# Patient Record
Sex: MALE | Race: WHITE | HISPANIC OR LATINO | Employment: OTHER | URBAN - METROPOLITAN AREA
[De-identification: names, ages, dates, MRNs, and addresses within clinical notes are randomized per-mention and may not be internally consistent; named-entity substitution may affect disease eponyms.]

---

## 2018-04-10 ENCOUNTER — OFFICE VISIT (OUTPATIENT)
Dept: PRIMARY CARE CLINIC | Facility: CLINIC | Age: 58
End: 2018-04-10
Payer: MEDICARE

## 2018-04-10 ENCOUNTER — CLINICAL SUPPORT (OUTPATIENT)
Dept: PRIMARY CARE CLINIC | Facility: CLINIC | Age: 58
End: 2018-04-10
Payer: MEDICARE

## 2018-04-10 VITALS
SYSTOLIC BLOOD PRESSURE: 129 MMHG | RESPIRATION RATE: 18 BRPM | WEIGHT: 183 LBS | DIASTOLIC BLOOD PRESSURE: 65 MMHG | BODY MASS INDEX: 25.62 KG/M2 | TEMPERATURE: 98 F | HEIGHT: 71 IN | OXYGEN SATURATION: 94 % | HEART RATE: 66 BPM

## 2018-04-10 DIAGNOSIS — R04.0 EPISTAXIS: ICD-10-CM

## 2018-04-10 DIAGNOSIS — M17.12 OSTEOARTHRITIS OF LEFT KNEE, UNSPECIFIED OSTEOARTHRITIS TYPE: Primary | ICD-10-CM

## 2018-04-10 DIAGNOSIS — M25.511 CHRONIC RIGHT SHOULDER PAIN: ICD-10-CM

## 2018-04-10 DIAGNOSIS — N40.0 BPH WITHOUT URINARY OBSTRUCTION: ICD-10-CM

## 2018-04-10 DIAGNOSIS — L30.9 ECZEMA, UNSPECIFIED TYPE: ICD-10-CM

## 2018-04-10 DIAGNOSIS — K21.9 GASTROESOPHAGEAL REFLUX DISEASE, ESOPHAGITIS PRESENCE NOT SPECIFIED: ICD-10-CM

## 2018-04-10 DIAGNOSIS — M51.36 DDD (DEGENERATIVE DISC DISEASE), LUMBAR: ICD-10-CM

## 2018-04-10 DIAGNOSIS — G89.29 CHRONIC RIGHT SHOULDER PAIN: ICD-10-CM

## 2018-04-10 DIAGNOSIS — Z98.890 HISTORY OF BACK SURGERY: ICD-10-CM

## 2018-04-10 PROBLEM — M51.369 DDD (DEGENERATIVE DISC DISEASE), LUMBAR: Status: ACTIVE | Noted: 2018-04-10

## 2018-04-10 LAB
ALBUMIN SERPL BCP-MCNC: 4.2 G/DL
ALP SERPL-CCNC: 67 U/L
ALT SERPL W/O P-5'-P-CCNC: 24 U/L
ANION GAP SERPL CALC-SCNC: 9 MMOL/L
AST SERPL-CCNC: 20 U/L
BASOPHILS # BLD AUTO: 0.08 K/UL
BASOPHILS NFR BLD: 0.9 %
BILIRUB SERPL-MCNC: 0.4 MG/DL
BUN SERPL-MCNC: 16 MG/DL
CALCIUM SERPL-MCNC: 10 MG/DL
CHLORIDE SERPL-SCNC: 101 MMOL/L
CHOLEST SERPL-MCNC: 154 MG/DL
CHOLEST/HDLC SERPL: 3.5 {RATIO}
CO2 SERPL-SCNC: 31 MMOL/L
COMPLEXED PSA SERPL-MCNC: 0.53 NG/ML
CREAT SERPL-MCNC: 0.8 MG/DL
DIFFERENTIAL METHOD: ABNORMAL
EOSINOPHIL # BLD AUTO: 0.3 K/UL
EOSINOPHIL NFR BLD: 3.6 %
ERYTHROCYTE [DISTWIDTH] IN BLOOD BY AUTOMATED COUNT: 11.5 %
EST. GFR  (AFRICAN AMERICAN): >60 ML/MIN/1.73 M^2
EST. GFR  (NON AFRICAN AMERICAN): >60 ML/MIN/1.73 M^2
GLUCOSE SERPL-MCNC: 77 MG/DL
HCT VFR BLD AUTO: 43.4 %
HDLC SERPL-MCNC: 44 MG/DL
HDLC SERPL: 28.6 %
HGB BLD-MCNC: 13.8 G/DL
IMM GRANULOCYTES # BLD AUTO: 0.02 K/UL
IMM GRANULOCYTES NFR BLD AUTO: 0.2 %
LDLC SERPL CALC-MCNC: 78.2 MG/DL
LYMPHOCYTES # BLD AUTO: 2.5 K/UL
LYMPHOCYTES NFR BLD: 28.1 %
MCH RBC QN AUTO: 30.9 PG
MCHC RBC AUTO-ENTMCNC: 31.8 G/DL
MCV RBC AUTO: 97 FL
MONOCYTES # BLD AUTO: 0.6 K/UL
MONOCYTES NFR BLD: 6.8 %
NEUTROPHILS # BLD AUTO: 5.3 K/UL
NEUTROPHILS NFR BLD: 60.4 %
NONHDLC SERPL-MCNC: 110 MG/DL
NRBC BLD-RTO: 0 /100 WBC
PLATELET # BLD AUTO: 265 K/UL
PMV BLD AUTO: 10.7 FL
POTASSIUM SERPL-SCNC: 4.7 MMOL/L
PROT SERPL-MCNC: 7.2 G/DL
RBC # BLD AUTO: 4.47 M/UL
SODIUM SERPL-SCNC: 141 MMOL/L
TRIGL SERPL-MCNC: 159 MG/DL
TSH SERPL DL<=0.005 MIU/L-ACNC: 0.49 UIU/ML
WBC # BLD AUTO: 8.84 K/UL

## 2018-04-10 PROCEDURE — 99213 OFFICE O/P EST LOW 20 MIN: CPT | Mod: S$GLB,,, | Performed by: FAMILY MEDICINE

## 2018-04-10 PROCEDURE — 84443 ASSAY THYROID STIM HORMONE: CPT

## 2018-04-10 PROCEDURE — 99499 UNLISTED E&M SERVICE: CPT | Mod: S$GLB,,, | Performed by: FAMILY MEDICINE

## 2018-04-10 PROCEDURE — 84153 ASSAY OF PSA TOTAL: CPT

## 2018-04-10 PROCEDURE — 80061 LIPID PANEL: CPT

## 2018-04-10 PROCEDURE — 80053 COMPREHEN METABOLIC PANEL: CPT

## 2018-04-10 PROCEDURE — 99999 PR PBB SHADOW E&M-EST. PATIENT-LVL II: CPT | Mod: PBBFAC,,,

## 2018-04-10 PROCEDURE — 99999 PR PBB SHADOW E&M-NEW PATIENT-LVL IV: CPT | Mod: PBBFAC,,, | Performed by: FAMILY MEDICINE

## 2018-04-10 PROCEDURE — 85025 COMPLETE CBC W/AUTO DIFF WBC: CPT

## 2018-04-10 RX ORDER — FLUTICASONE PROPIONATE 50 MCG
2 SPRAY, SUSPENSION (ML) NASAL DAILY
Qty: 1 BOTTLE | Refills: 5 | Status: SHIPPED | OUTPATIENT
Start: 2018-04-10 | End: 2019-11-01 | Stop reason: CLARIF

## 2018-04-10 NOTE — PROGRESS NOTES
Pt ID verified by Name and . EKG done per MD order. Pt tolerated well. Result signed off by Dr. Garcia.

## 2018-04-10 NOTE — PROGRESS NOTES
Subjective:       Patient ID: Michelle Hankins is a 57 y.o. male.    Chief Complaint: Knee Pain (left knee )    HPI: 57-year-old male patient was seen by orthopedist Dr. Saul Smith sent with a prescription for an x-ray of the left knee secondary to arthritis.  Patient was seen by Dr. Smith had a knee injection due to a swollen knee.  Patient had problems with the left knee for years.  Unable to bend the knee, able to walk but painful.  Got shot in the knee on Friday 5 days ago.  Still with some pain.  Patient on Roxicodone and Lodine..       Patient has a history of a dislocated right shoulder and states orthopedist wants to do surgery but he is not ready for that yet.  Has crepitus of the right shoulder when he rotates it or tries to raise arm above head.  Had surgery on the left shoulder.           ROS:  Skin: no psoriasis, eczema, skin cancer Pt seen Dr Patel for dry skin lateral chest wall   HEENT: No headache, ocular pain, blurred vision, diplopia, +epistaxis, no hoarseness change in voice, thyroid trouble patient with history of ALLERGIES and nosebleeds left nostril hx nasal polyp   Lung: No pneumonia, asthma, Tb, wheezing, SOB, no smoking  Heart: No chest pain, ankle edema, palpitations, MI, mary murmur, hypertension, hyperlipidemia  Abdomen: No nausea, vomiting, diarrhea, constipation, ulcers, hepatitis, gallbladder disease, melena, hematochezia, hematemesis Hx GERD doing well  : no UTI, renal disease, stones Hx BPH   MS: no fractures, lupus, rheumatoid, gout See HPI   Neuro: No dizziness, LOC, seizures   No diabetes, no anemia, no anxiety, no depression   , one child, SSI due back problems -- had history of back surgery in the past told needs another surgery.  Lives with wife    Objective:   Physical Exam:  General: Well nourished, well developed, no acute distress  Skin: Dry skin especially on the trunk lateral chest area  HEENT: Eyes PERRLA, EOM intact, nose patent, throat  non-erythematous ears TM clear  NECK: Supple, no bruits, No JVD, no nodes  Lungs: Clear, no rales, rhonchi, wheezing  Heart: Regular rate and rhythm, no murmurs, gallops, or rubs  Abdomen: flat, bowel sounds positive, no tenderness, or organomegaly  MS: Some crepitus with rotation of the shoulder able raise arms overhead but painful, crepitus of the left knee with anterior posterior flexion able squat MCFP down hard to arise, scar in the lower back from prior surgery anterior flexion 20° extension 10° lateral flexion and rotation 10°  Neuro: Alert, CN intact, oriented X 3  Extremities: No cyanosis, clubbing, or edema         Assessment:       1. Osteoarthritis of left knee, unspecified osteoarthritis type    2. Chronic right shoulder pain    3. DDD (degenerative disc disease), lumbar    4. History of back surgery    5. Epistaxis    6. Eczema, unspecified type    7. Gastroesophageal reflux disease, esophagitis presence not specified    8. BPH without urinary obstruction        Plan:       Osteoarthritis of left knee, unspecified osteoarthritis type  -     X-Ray Knee 3 View Left; Future; Expected date: 04/10/2018    Chronic right shoulder pain    DDD (degenerative disc disease), lumbar    History of back surgery    Epistaxis    Eczema, unspecified type  -     Ambulatory referral to Dermatology    Gastroesophageal reflux disease, esophagitis presence not specified    BPH without urinary obstruction  -     CBC auto differential; Future; Expected date: 04/10/2018  -     Comprehensive metabolic panel; Future; Expected date: 04/10/2018  -     Lipid panel; Future; Expected date: 04/10/2018  -     Prostate Specific Antigen, Diagnostic; Future; Expected date: 04/10/2018  -     X-Ray Chest PA And Lateral; Future; Expected date: 04/10/2018  -     Urinalysis  -     EKG 12-lead; Future  -     TSH; Future; Expected date: 04/10/2018    Other orders  -     fluticasone (FLONASE) 50 mcg/actuation nasal spray; 2 sprays (100 mcg  total) by Each Nare route once daily.  Dispense: 1 Bottle; Refill: 5      patient seen Dr. Saul Smith for left knee arthritis--had injection in her knee on Friday 5 days ago--on Roxicodone for back and knee Lodine--states getting no relief told to address this problem with this orthopedist or we could refer her to a pain clinic  X-ray of the left knee per Dr. Smith's request  Dr. Shoemaker  to evaluate rash left chest wall--eczema  Patient with nosebleeds it persists should seer HARLEY--has history of nasal polyps will try Claritin and Flonase  Moist heat Theragesic range of motion of the back--patient told needs back surgery but does not want to do this at this time.  Patient also told needs right shoulder surgery does not want to do at this time  Needs routine physical CBC CMP lipid UA PSA chest x-ray EKG with x-ray lumbar spine

## 2018-10-08 ENCOUNTER — CLINICAL SUPPORT (OUTPATIENT)
Dept: PRIMARY CARE CLINIC | Facility: CLINIC | Age: 58
End: 2018-10-08
Payer: MEDICARE

## 2018-10-08 DIAGNOSIS — Z23 NEED FOR PROPHYLACTIC VACCINATION AND INOCULATION AGAINST INFLUENZA: Primary | ICD-10-CM

## 2018-10-08 PROCEDURE — 90686 IIV4 VACC NO PRSV 0.5 ML IM: CPT | Mod: PBBFAC,PN

## 2019-03-20 DIAGNOSIS — Z12.11 COLON CANCER SCREENING: ICD-10-CM

## 2020-09-24 ENCOUNTER — OFFICE VISIT (OUTPATIENT)
Dept: PRIMARY CARE CLINIC | Facility: CLINIC | Age: 60
End: 2020-09-24
Payer: MEDICARE

## 2020-09-24 VITALS
HEART RATE: 56 BPM | WEIGHT: 180.88 LBS | DIASTOLIC BLOOD PRESSURE: 84 MMHG | BODY MASS INDEX: 25.32 KG/M2 | RESPIRATION RATE: 17 BRPM | SYSTOLIC BLOOD PRESSURE: 152 MMHG | OXYGEN SATURATION: 98 % | HEIGHT: 71 IN

## 2020-09-24 DIAGNOSIS — N40.0 BPH WITHOUT URINARY OBSTRUCTION: ICD-10-CM

## 2020-09-24 DIAGNOSIS — I10 HYPERTENSION, UNSPECIFIED TYPE: ICD-10-CM

## 2020-09-24 DIAGNOSIS — J33.9 NASAL POLYP: ICD-10-CM

## 2020-09-24 DIAGNOSIS — Z11.4 ENCOUNTER FOR SCREENING FOR HIV: ICD-10-CM

## 2020-09-24 DIAGNOSIS — E78.5 HYPERLIPIDEMIA, UNSPECIFIED HYPERLIPIDEMIA TYPE: ICD-10-CM

## 2020-09-24 DIAGNOSIS — M17.12 OSTEOARTHRITIS OF LEFT KNEE, UNSPECIFIED OSTEOARTHRITIS TYPE: ICD-10-CM

## 2020-09-24 DIAGNOSIS — R41.3 MEMORY LOSS: ICD-10-CM

## 2020-09-24 DIAGNOSIS — Z98.890 HISTORY OF SHOULDER SURGERY: ICD-10-CM

## 2020-09-24 DIAGNOSIS — K21.9 GASTROESOPHAGEAL REFLUX DISEASE, ESOPHAGITIS PRESENCE NOT SPECIFIED: ICD-10-CM

## 2020-09-24 DIAGNOSIS — Z11.59 NEED FOR HEPATITIS C SCREENING TEST: ICD-10-CM

## 2020-09-24 DIAGNOSIS — J32.2 CHRONIC ETHMOIDAL SINUSITIS: ICD-10-CM

## 2020-09-24 DIAGNOSIS — R04.0 EPISTAXIS: Primary | ICD-10-CM

## 2020-09-24 DIAGNOSIS — Z98.890 HISTORY OF BACK SURGERY: ICD-10-CM

## 2020-09-24 DIAGNOSIS — M25.511 CHRONIC RIGHT SHOULDER PAIN: ICD-10-CM

## 2020-09-24 DIAGNOSIS — Z98.890 HISTORY OF PROSTATE SURGERY: ICD-10-CM

## 2020-09-24 DIAGNOSIS — L30.9 ECZEMA, UNSPECIFIED TYPE: ICD-10-CM

## 2020-09-24 DIAGNOSIS — G89.29 CHRONIC RIGHT SHOULDER PAIN: ICD-10-CM

## 2020-09-24 PROCEDURE — 99999 PR PBB SHADOW E&M-EST. PATIENT-LVL III: ICD-10-PCS | Mod: PBBFAC,,, | Performed by: FAMILY MEDICINE

## 2020-09-24 PROCEDURE — 99214 PR OFFICE/OUTPT VISIT, EST, LEVL IV, 30-39 MIN: ICD-10-PCS | Mod: 25,S$GLB,, | Performed by: FAMILY MEDICINE

## 2020-09-24 PROCEDURE — 99499 RISK ADDL DX/OHS AUDIT: ICD-10-PCS | Mod: S$GLB,,, | Performed by: FAMILY MEDICINE

## 2020-09-24 PROCEDURE — 99499 UNLISTED E&M SERVICE: CPT | Mod: S$GLB,,, | Performed by: FAMILY MEDICINE

## 2020-09-24 PROCEDURE — 90686 FLU VACCINE (QUAD) GREATER THAN OR EQUAL TO 3YO PRESERVATIVE FREE IM: ICD-10-PCS | Mod: S$GLB,,, | Performed by: FAMILY MEDICINE

## 2020-09-24 PROCEDURE — G0008 ADMIN INFLUENZA VIRUS VAC: HCPCS | Mod: S$GLB,,, | Performed by: FAMILY MEDICINE

## 2020-09-24 PROCEDURE — 3008F BODY MASS INDEX DOCD: CPT | Mod: CPTII,S$GLB,, | Performed by: FAMILY MEDICINE

## 2020-09-24 PROCEDURE — 99214 OFFICE O/P EST MOD 30 MIN: CPT | Mod: 25,S$GLB,, | Performed by: FAMILY MEDICINE

## 2020-09-24 PROCEDURE — G0008 FLU VACCINE (QUAD) GREATER THAN OR EQUAL TO 3YO PRESERVATIVE FREE IM: ICD-10-PCS | Mod: S$GLB,,, | Performed by: FAMILY MEDICINE

## 2020-09-24 PROCEDURE — 3008F PR BODY MASS INDEX (BMI) DOCUMENTED: ICD-10-PCS | Mod: CPTII,S$GLB,, | Performed by: FAMILY MEDICINE

## 2020-09-24 PROCEDURE — 90686 IIV4 VACC NO PRSV 0.5 ML IM: CPT | Mod: S$GLB,,, | Performed by: FAMILY MEDICINE

## 2020-09-24 PROCEDURE — 99999 PR PBB SHADOW E&M-EST. PATIENT-LVL III: CPT | Mod: PBBFAC,,, | Performed by: FAMILY MEDICINE

## 2020-09-24 RX ORDER — LOSARTAN POTASSIUM 50 MG/1
50 TABLET ORAL DAILY
Qty: 90 TABLET | Refills: 3 | Status: SHIPPED | OUTPATIENT
Start: 2020-09-24 | End: 2021-04-12

## 2020-09-24 NOTE — PROGRESS NOTES
Verified pt ID using name and . NKDA. Administered Influenza vaccine in left deltoid per physician order using aseptic technique. Aspirated and no blood return noted. Pt tolerated well with no adverse reactions noted.

## 2020-09-24 NOTE — PROGRESS NOTES
Subjective:       Patient ID: Michelle Hankins is a 60 y.o. male.    Chief Complaint: Annual Exam    HPI:  60-year-old male in for checkup has not been seen in awhile wants a flu shot      Eating well--+BM--ambulation--saw orthopedist told patient needed surgery on the left knee but he did a an injection because patient prefers to wait on surgery--sees orthopedist every 2-3 months and gets injection in the knee.  History epidural steroid injection in the back about a week ago      Hypertension blood pressure 152/84-patient does not check his blood pressure at home    ROS:  Skin: no psoriasis, eczema, skin cancer  HEENT: No headache, ocular pain, blurred vision, diplopia, + occasional epistaxis,no hoarseness change in voice, thyroid trouble history of chronic ethmoid sinusitis and nasal polyps  Lung: No pneumonia, asthma, Tb, wheezing, SOB, no smoking  Heart: No chest pain, ankle edema, palpitations, MI, mary murmur, +hypertension,+ hyperlipidemia--no stent bypass arrhythmia  Abdomen:  History GERD in the past doing okay now No nausea, vomiting, diarrhea, constipation, ulcers, hepatitis, gallbladder disease, melena, hematochezia, hematemesis  : no UTI, renal disease, stones BPH  MS: no fractures, O/A, lupus, rheumatoid, gout-osteoarthritis left knee--getting injections in the knee--history of back surgery getting injections in the back--history of a trigger finger   Neuro: No dizziness, LOC, seizures   No diabetes, no anemia, no anxiety, no depression  , 1 daughter, disabled--due to back, lives with wife     Objective:   Physical Exam:  General: Well nourished, well developed, no acute distress  Skin: No lesions  HEENT: Eyes PERRLA, EOM intact, nose patent-unable see any specific lesions, throat non-erythematous ears TMs clear  NECK: Supple, no bruits, No JVD, no nodes  Lungs: Clear, no rales, rhonchi, wheezing  Heart: Regular rate and rhythm, no murmurs, gallops, or rubs  Abdomen: flat, bowel sounds  positive, no tenderness, or organomegaly  MS:  Tenderness right shoulder--pain with rotation--able raise overhead but painful--tenderness left knee--some crepitus and pain with flexion extension able squat arise but somewhat tender--back pain anterior flexion 10° extension 10° lateral flexion rotation 10° straight leg lift pulling sensation back no radiculopathy  Neuro: Alert, CN intact, oriented X 3 Romberg negative heel-toe intact  Extremities: No cyanosis, clubbing, or edema         Assessment:       1. Epistaxis    2. Hypertension, unspecified type    3. Hyperlipidemia, unspecified hyperlipidemia type    4. Eczema, unspecified type    5. BPH without urinary obstruction    6. Gastroesophageal reflux disease, esophagitis presence not specified    7. Chronic right shoulder pain    8. History of back surgery    9. Nasal polyp    10. Chronic ethmoidal sinusitis    11. History of prostate surgery    12. History of shoulder surgery    13. Osteoarthritis of left knee, unspecified osteoarthritis type    14. Memory loss    15. Encounter for screening for HIV    16. Need for hepatitis C screening test        Plan:       Epistaxis  -     Ambulatory referral/consult to ENT; Future; Expected date: 10/01/2020    Hypertension, unspecified type  -     CBC auto differential; Future; Expected date: 09/24/2020  -     Comprehensive metabolic panel; Future; Expected date: 09/24/2020  -     EKG 12-lead; Future  -     Fecal Immunochemical Test (iFOBT); Future; Expected date: 09/24/2020  -     X-Ray Chest PA And Lateral; Future; Expected date: 09/24/2020  -     POCT urine dipstick without microscope  -     T4, free; Future; Expected date: 09/24/2020  -     TSH; Future; Expected date: 09/24/2020    Hyperlipidemia, unspecified hyperlipidemia type  -     Lipid Panel; Future; Expected date: 09/24/2020    Eczema, unspecified type    BPH without urinary obstruction  -     Prostate Specific Antigen, Diagnostic; Future; Expected date:  09/24/2020    Gastroesophageal reflux disease, esophagitis presence not specified    Chronic right shoulder pain    History of back surgery    Nasal polyp    Chronic ethmoidal sinusitis    History of prostate surgery  -     Prostate Specific Antigen, Diagnostic; Future; Expected date: 09/24/2020    History of shoulder surgery    Osteoarthritis of left knee, unspecified osteoarthritis type    Memory loss    Encounter for screening for HIV  -     HIV 1/2 Ag/Ab (4th Gen); Future; Expected date: 09/24/2020    Need for hepatitis C screening test  -     Hepatitis C Antibody; Future; Expected date: 09/24/2020    Other orders  -     Influenza - Quadrivalent *Preferred* (6 months+) (PF)  -     (In Office Administered) Td Vaccine - Preservative Free  -     losartan (COZAAR) 50 MG tablet; Take 1 tablet (50 mg total) by mouth once daily.  Dispense: 90 tablet; Refill: 3        Hypertension/hyperlipidemia----low-sodium low-fat diet Needs arm blood pressure cuff take pressure daily Needs blood pressure be 140/90 or less greater than 90/60 Cozaar 50 mg 1 p.o. q.d. for blood pressure get lab see if needs to be on cholesterol medication  History of GERD--occurs intermittently usually response to baking soda discussed omeprazole if has breakthrough pain--once has a flare should avoid smoking alcohol stress carbonated drinks NSAIDs caffeine  History back surgery--disabled due to his back--getting epidural steroid injections in back Keep appointment with neurosurgeon or orthopedist  Osteoarthritis left knee--told needed knee surgery prefers not to get it yet--getting injections in the every 2-3 months Keep appointment with orthopedist  Right shoulder pain history of right shoulder surgery had shoulder scrape Keep appointment with orthopedistHistory of BPH states doing fairly well now half prostate surgery  Lab CBCs CMP lipids T4 TSH stool guaiac UA chest x-ray EKG PSA level  Health maintenance hepatitis C colorectal screen HIV tetanus  shingles flu  History of memory loss in the past saw neurologist had several test told was okay patient still concern

## 2020-10-05 ENCOUNTER — PATIENT MESSAGE (OUTPATIENT)
Dept: ADMINISTRATIVE | Facility: HOSPITAL | Age: 60
End: 2020-10-05

## 2021-03-27 ENCOUNTER — IMMUNIZATION (OUTPATIENT)
Dept: PRIMARY CARE CLINIC | Facility: CLINIC | Age: 61
End: 2021-03-27
Payer: MEDICARE

## 2021-03-27 DIAGNOSIS — Z23 NEED FOR VACCINATION: Primary | ICD-10-CM

## 2021-03-27 PROCEDURE — 91300 COVID-19, MRNA, LNP-S, PF, 30 MCG/0.3 ML DOSE VACCINE: CPT | Mod: PBBFAC | Performed by: EMERGENCY MEDICINE

## 2021-04-05 ENCOUNTER — PATIENT MESSAGE (OUTPATIENT)
Dept: ADMINISTRATIVE | Facility: HOSPITAL | Age: 61
End: 2021-04-05

## 2021-04-12 ENCOUNTER — OFFICE VISIT (OUTPATIENT)
Dept: PRIMARY CARE CLINIC | Facility: CLINIC | Age: 61
End: 2021-04-12
Payer: MEDICARE

## 2021-04-12 VITALS
SYSTOLIC BLOOD PRESSURE: 136 MMHG | RESPIRATION RATE: 18 BRPM | HEART RATE: 87 BPM | HEIGHT: 71 IN | WEIGHT: 171.19 LBS | BODY MASS INDEX: 23.97 KG/M2 | TEMPERATURE: 99 F | OXYGEN SATURATION: 98 % | DIASTOLIC BLOOD PRESSURE: 88 MMHG

## 2021-04-12 DIAGNOSIS — R09.1 PLEURISY: Primary | ICD-10-CM

## 2021-04-12 DIAGNOSIS — M51.36 DDD (DEGENERATIVE DISC DISEASE), LUMBAR: ICD-10-CM

## 2021-04-12 DIAGNOSIS — G89.29 CHRONIC RIGHT SHOULDER PAIN: ICD-10-CM

## 2021-04-12 DIAGNOSIS — E78.5 HYPERLIPIDEMIA, UNSPECIFIED HYPERLIPIDEMIA TYPE: ICD-10-CM

## 2021-04-12 DIAGNOSIS — M25.511 CHRONIC RIGHT SHOULDER PAIN: ICD-10-CM

## 2021-04-12 DIAGNOSIS — K21.9 GASTROESOPHAGEAL REFLUX DISEASE, UNSPECIFIED WHETHER ESOPHAGITIS PRESENT: ICD-10-CM

## 2021-04-12 DIAGNOSIS — L30.9 ECZEMA, UNSPECIFIED TYPE: ICD-10-CM

## 2021-04-12 DIAGNOSIS — Z98.890 HISTORY OF PROSTATE SURGERY: ICD-10-CM

## 2021-04-12 DIAGNOSIS — Z98.890 HISTORY OF BACK SURGERY: ICD-10-CM

## 2021-04-12 DIAGNOSIS — I10 HYPERTENSION, UNSPECIFIED TYPE: ICD-10-CM

## 2021-04-12 DIAGNOSIS — R04.0 EPISTAXIS: ICD-10-CM

## 2021-04-12 DIAGNOSIS — Z98.890 HISTORY OF SHOULDER SURGERY: ICD-10-CM

## 2021-04-12 PROCEDURE — 99999 PR PBB SHADOW E&M-EST. PATIENT-LVL III: ICD-10-PCS | Mod: PBBFAC,,, | Performed by: FAMILY MEDICINE

## 2021-04-12 PROCEDURE — 1126F AMNT PAIN NOTED NONE PRSNT: CPT | Mod: S$GLB,,, | Performed by: FAMILY MEDICINE

## 2021-04-12 PROCEDURE — 96372 THER/PROPH/DIAG INJ SC/IM: CPT | Mod: S$GLB,,, | Performed by: FAMILY MEDICINE

## 2021-04-12 PROCEDURE — 99214 OFFICE O/P EST MOD 30 MIN: CPT | Mod: 25,S$GLB,, | Performed by: FAMILY MEDICINE

## 2021-04-12 PROCEDURE — 3008F BODY MASS INDEX DOCD: CPT | Mod: CPTII,S$GLB,, | Performed by: FAMILY MEDICINE

## 2021-04-12 PROCEDURE — 99499 UNLISTED E&M SERVICE: CPT | Mod: S$GLB,,, | Performed by: FAMILY MEDICINE

## 2021-04-12 PROCEDURE — 99214 PR OFFICE/OUTPT VISIT, EST, LEVL IV, 30-39 MIN: ICD-10-PCS | Mod: 25,S$GLB,, | Performed by: FAMILY MEDICINE

## 2021-04-12 PROCEDURE — 1126F PR PAIN SEVERITY QUANTIFIED, NO PAIN PRESENT: ICD-10-PCS | Mod: S$GLB,,, | Performed by: FAMILY MEDICINE

## 2021-04-12 PROCEDURE — 96372 PR INJECTION,THERAP/PROPH/DIAG2ST, IM OR SUBCUT: ICD-10-PCS | Mod: S$GLB,,, | Performed by: FAMILY MEDICINE

## 2021-04-12 PROCEDURE — 99499 RISK ADDL DX/OHS AUDIT: ICD-10-PCS | Mod: S$GLB,,, | Performed by: FAMILY MEDICINE

## 2021-04-12 PROCEDURE — 99999 PR PBB SHADOW E&M-EST. PATIENT-LVL III: CPT | Mod: PBBFAC,,, | Performed by: FAMILY MEDICINE

## 2021-04-12 PROCEDURE — 3008F PR BODY MASS INDEX (BMI) DOCUMENTED: ICD-10-PCS | Mod: CPTII,S$GLB,, | Performed by: FAMILY MEDICINE

## 2021-04-12 RX ORDER — METHYLPREDNISOLONE 4 MG/1
TABLET ORAL
Qty: 1 PACKAGE | Refills: 0 | Status: SHIPPED | OUTPATIENT
Start: 2021-04-12 | End: 2021-04-14

## 2021-04-12 RX ORDER — LEVOFLOXACIN 500 MG/1
500 TABLET, FILM COATED ORAL DAILY
Qty: 10 TABLET | Refills: 0 | Status: SHIPPED | OUTPATIENT
Start: 2021-04-12 | End: 2021-04-14

## 2021-04-12 RX ORDER — PROMETHAZINE HYDROCHLORIDE AND CODEINE PHOSPHATE 6.25; 1 MG/5ML; MG/5ML
5 SOLUTION ORAL EVERY 6 HOURS PRN
Qty: 180 ML | Refills: 0 | Status: SHIPPED | OUTPATIENT
Start: 2021-04-12 | End: 2021-10-06

## 2021-04-12 RX ORDER — TRIAMCINOLONE ACETONIDE 40 MG/ML
40 INJECTION, SUSPENSION INTRA-ARTICULAR; INTRAMUSCULAR ONCE
Status: COMPLETED | OUTPATIENT
Start: 2021-04-12 | End: 2021-04-12

## 2021-04-12 RX ADMIN — TRIAMCINOLONE ACETONIDE 40 MG: 40 INJECTION, SUSPENSION INTRA-ARTICULAR; INTRAMUSCULAR at 12:04

## 2021-04-17 ENCOUNTER — IMMUNIZATION (OUTPATIENT)
Dept: PRIMARY CARE CLINIC | Facility: CLINIC | Age: 61
End: 2021-04-17
Payer: MEDICARE

## 2021-04-17 DIAGNOSIS — Z23 NEED FOR VACCINATION: Primary | ICD-10-CM

## 2021-04-17 PROCEDURE — 0002A COVID-19, MRNA, LNP-S, PF, 30 MCG/0.3 ML DOSE VACCINE: CPT | Mod: CV19,S$GLB,, | Performed by: FAMILY MEDICINE

## 2021-04-17 PROCEDURE — 0002A COVID-19, MRNA, LNP-S, PF, 30 MCG/0.3 ML DOSE VACCINE: ICD-10-PCS | Mod: CV19,S$GLB,, | Performed by: FAMILY MEDICINE

## 2021-04-17 PROCEDURE — 91300 COVID-19, MRNA, LNP-S, PF, 30 MCG/0.3 ML DOSE VACCINE: CPT | Mod: S$GLB,,, | Performed by: FAMILY MEDICINE

## 2021-04-17 PROCEDURE — 91300 COVID-19, MRNA, LNP-S, PF, 30 MCG/0.3 ML DOSE VACCINE: ICD-10-PCS | Mod: S$GLB,,, | Performed by: FAMILY MEDICINE

## 2021-05-02 ENCOUNTER — PATIENT MESSAGE (OUTPATIENT)
Dept: ADMINISTRATIVE | Facility: HOSPITAL | Age: 61
End: 2021-05-02

## 2021-07-06 ENCOUNTER — PATIENT MESSAGE (OUTPATIENT)
Dept: ADMINISTRATIVE | Facility: HOSPITAL | Age: 61
End: 2021-07-06

## 2021-10-04 ENCOUNTER — TELEPHONE (OUTPATIENT)
Dept: PRIMARY CARE CLINIC | Facility: CLINIC | Age: 61
End: 2021-10-04

## 2021-10-06 ENCOUNTER — OFFICE VISIT (OUTPATIENT)
Dept: PRIMARY CARE CLINIC | Facility: CLINIC | Age: 61
End: 2021-10-06
Payer: MEDICARE

## 2021-10-06 VITALS
TEMPERATURE: 99 F | OXYGEN SATURATION: 97 % | SYSTOLIC BLOOD PRESSURE: 128 MMHG | DIASTOLIC BLOOD PRESSURE: 64 MMHG | RESPIRATION RATE: 16 BRPM | HEIGHT: 71 IN | WEIGHT: 181.44 LBS | HEART RATE: 55 BPM | BODY MASS INDEX: 25.4 KG/M2

## 2021-10-06 DIAGNOSIS — R04.0 EPISTAXIS: Primary | ICD-10-CM

## 2021-10-06 DIAGNOSIS — J33.9 NASAL POLYP: ICD-10-CM

## 2021-10-06 DIAGNOSIS — M17.12 OSTEOARTHRITIS OF LEFT KNEE, UNSPECIFIED OSTEOARTHRITIS TYPE: ICD-10-CM

## 2021-10-06 DIAGNOSIS — Z98.890 HISTORY OF PROSTATE SURGERY: ICD-10-CM

## 2021-10-06 DIAGNOSIS — L30.9 ECZEMA, UNSPECIFIED TYPE: ICD-10-CM

## 2021-10-06 DIAGNOSIS — T17.208A FOREIGN BODY IN PHARYNX, INITIAL ENCOUNTER: ICD-10-CM

## 2021-10-06 DIAGNOSIS — K21.9 GASTROESOPHAGEAL REFLUX DISEASE, UNSPECIFIED WHETHER ESOPHAGITIS PRESENT: ICD-10-CM

## 2021-10-06 DIAGNOSIS — N40.1 BENIGN PROSTATIC HYPERPLASIA WITH LOWER URINARY TRACT SYMPTOMS, SYMPTOM DETAILS UNSPECIFIED: ICD-10-CM

## 2021-10-06 DIAGNOSIS — I10 HYPERTENSION, UNSPECIFIED TYPE: ICD-10-CM

## 2021-10-06 DIAGNOSIS — J02.9 PHARYNGITIS, UNSPECIFIED ETIOLOGY: ICD-10-CM

## 2021-10-06 DIAGNOSIS — J32.2 CHRONIC ETHMOIDAL SINUSITIS: ICD-10-CM

## 2021-10-06 DIAGNOSIS — N40.0 BPH WITHOUT URINARY OBSTRUCTION: ICD-10-CM

## 2021-10-06 PROCEDURE — 99214 OFFICE O/P EST MOD 30 MIN: CPT | Mod: 25,S$GLB,, | Performed by: FAMILY MEDICINE

## 2021-10-06 PROCEDURE — 3008F PR BODY MASS INDEX (BMI) DOCUMENTED: ICD-10-PCS | Mod: CPTII,S$GLB,, | Performed by: FAMILY MEDICINE

## 2021-10-06 PROCEDURE — 3078F PR MOST RECENT DIASTOLIC BLOOD PRESSURE < 80 MM HG: ICD-10-PCS | Mod: CPTII,S$GLB,, | Performed by: FAMILY MEDICINE

## 2021-10-06 PROCEDURE — 1159F PR MEDICATION LIST DOCUMENTED IN MEDICAL RECORD: ICD-10-PCS | Mod: CPTII,S$GLB,, | Performed by: FAMILY MEDICINE

## 2021-10-06 PROCEDURE — 4010F PR ACE/ARB THEARPY RXD/TAKEN: ICD-10-PCS | Mod: CPTII,S$GLB,, | Performed by: FAMILY MEDICINE

## 2021-10-06 PROCEDURE — 99999 PR PBB SHADOW E&M-EST. PATIENT-LVL III: CPT | Mod: PBBFAC,,, | Performed by: FAMILY MEDICINE

## 2021-10-06 PROCEDURE — 99499 RISK ADDL DX/OHS AUDIT: ICD-10-PCS | Mod: S$GLB,,, | Performed by: FAMILY MEDICINE

## 2021-10-06 PROCEDURE — 96372 THER/PROPH/DIAG INJ SC/IM: CPT | Mod: S$GLB,,, | Performed by: FAMILY MEDICINE

## 2021-10-06 PROCEDURE — 1159F MED LIST DOCD IN RCRD: CPT | Mod: CPTII,S$GLB,, | Performed by: FAMILY MEDICINE

## 2021-10-06 PROCEDURE — 96372 PR INJECTION,THERAP/PROPH/DIAG2ST, IM OR SUBCUT: ICD-10-PCS | Mod: S$GLB,,, | Performed by: FAMILY MEDICINE

## 2021-10-06 PROCEDURE — 3074F SYST BP LT 130 MM HG: CPT | Mod: CPTII,S$GLB,, | Performed by: FAMILY MEDICINE

## 2021-10-06 PROCEDURE — 3008F BODY MASS INDEX DOCD: CPT | Mod: CPTII,S$GLB,, | Performed by: FAMILY MEDICINE

## 2021-10-06 PROCEDURE — 99214 PR OFFICE/OUTPT VISIT, EST, LEVL IV, 30-39 MIN: ICD-10-PCS | Mod: 25,S$GLB,, | Performed by: FAMILY MEDICINE

## 2021-10-06 PROCEDURE — 3074F PR MOST RECENT SYSTOLIC BLOOD PRESSURE < 130 MM HG: ICD-10-PCS | Mod: CPTII,S$GLB,, | Performed by: FAMILY MEDICINE

## 2021-10-06 PROCEDURE — 4010F ACE/ARB THERAPY RXD/TAKEN: CPT | Mod: CPTII,S$GLB,, | Performed by: FAMILY MEDICINE

## 2021-10-06 PROCEDURE — 99499 UNLISTED E&M SERVICE: CPT | Mod: S$GLB,,, | Performed by: FAMILY MEDICINE

## 2021-10-06 PROCEDURE — 3078F DIAST BP <80 MM HG: CPT | Mod: CPTII,S$GLB,, | Performed by: FAMILY MEDICINE

## 2021-10-06 PROCEDURE — 99999 PR PBB SHADOW E&M-EST. PATIENT-LVL III: ICD-10-PCS | Mod: PBBFAC,,, | Performed by: FAMILY MEDICINE

## 2021-10-06 RX ORDER — CEFDINIR 300 MG/1
300 CAPSULE ORAL 2 TIMES DAILY
Qty: 20 CAPSULE | Refills: 0 | Status: SHIPPED | OUTPATIENT
Start: 2021-10-06 | End: 2021-10-16

## 2021-10-06 RX ORDER — TRIAMCINOLONE ACETONIDE 40 MG/ML
40 INJECTION, SUSPENSION INTRA-ARTICULAR; INTRAMUSCULAR ONCE
Status: COMPLETED | OUTPATIENT
Start: 2021-10-06 | End: 2021-10-06

## 2021-10-06 RX ORDER — METHYLPREDNISOLONE 4 MG/1
TABLET ORAL
Qty: 1 PACKAGE | Refills: 0 | Status: SHIPPED | OUTPATIENT
Start: 2021-10-06 | End: 2022-01-19

## 2021-10-06 RX ADMIN — TRIAMCINOLONE ACETONIDE 40 MG: 40 INJECTION, SUSPENSION INTRA-ARTICULAR; INTRAMUSCULAR at 02:10

## 2021-12-03 ENCOUNTER — IMMUNIZATION (OUTPATIENT)
Dept: PRIMARY CARE CLINIC | Facility: CLINIC | Age: 61
End: 2021-12-03
Payer: MEDICARE

## 2021-12-03 DIAGNOSIS — Z23 NEED FOR VACCINATION: Primary | ICD-10-CM

## 2021-12-03 PROCEDURE — 0004A COVID-19, MRNA, LNP-S, PF, 30 MCG/0.3 ML DOSE VACCINE: CPT | Mod: PBBFAC | Performed by: EMERGENCY MEDICINE

## 2022-01-19 ENCOUNTER — OFFICE VISIT (OUTPATIENT)
Dept: PRIMARY CARE CLINIC | Facility: CLINIC | Age: 62
End: 2022-01-19
Payer: MEDICARE

## 2022-01-19 ENCOUNTER — TELEPHONE (OUTPATIENT)
Dept: PRIMARY CARE CLINIC | Facility: CLINIC | Age: 62
End: 2022-01-19
Payer: MEDICARE

## 2022-01-19 VITALS
HEIGHT: 71 IN | WEIGHT: 191.13 LBS | OXYGEN SATURATION: 97 % | HEART RATE: 67 BPM | SYSTOLIC BLOOD PRESSURE: 130 MMHG | BODY MASS INDEX: 26.76 KG/M2 | RESPIRATION RATE: 16 BRPM | DIASTOLIC BLOOD PRESSURE: 72 MMHG

## 2022-01-19 DIAGNOSIS — M51.36 DDD (DEGENERATIVE DISC DISEASE), LUMBAR: ICD-10-CM

## 2022-01-19 DIAGNOSIS — L30.9 ECZEMA, UNSPECIFIED TYPE: Primary | ICD-10-CM

## 2022-01-19 DIAGNOSIS — N40.0 BENIGN PROSTATIC HYPERPLASIA, UNSPECIFIED WHETHER LOWER URINARY TRACT SYMPTOMS PRESENT: ICD-10-CM

## 2022-01-19 DIAGNOSIS — I10 HYPERTENSION, UNSPECIFIED TYPE: ICD-10-CM

## 2022-01-19 DIAGNOSIS — Z98.890 HISTORY OF BACK SURGERY: ICD-10-CM

## 2022-01-19 DIAGNOSIS — M17.12 OSTEOARTHRITIS OF LEFT KNEE, UNSPECIFIED OSTEOARTHRITIS TYPE: ICD-10-CM

## 2022-01-19 DIAGNOSIS — R41.3 MEMORY LOSS: ICD-10-CM

## 2022-01-19 DIAGNOSIS — E78.5 HYPERLIPIDEMIA, UNSPECIFIED HYPERLIPIDEMIA TYPE: ICD-10-CM

## 2022-01-19 PROCEDURE — 1159F PR MEDICATION LIST DOCUMENTED IN MEDICAL RECORD: ICD-10-PCS | Mod: CPTII,S$GLB,, | Performed by: FAMILY MEDICINE

## 2022-01-19 PROCEDURE — 99214 OFFICE O/P EST MOD 30 MIN: CPT | Mod: 25,S$GLB,, | Performed by: FAMILY MEDICINE

## 2022-01-19 PROCEDURE — 3078F PR MOST RECENT DIASTOLIC BLOOD PRESSURE < 80 MM HG: ICD-10-PCS | Mod: CPTII,S$GLB,, | Performed by: FAMILY MEDICINE

## 2022-01-19 PROCEDURE — 96372 PR INJECTION,THERAP/PROPH/DIAG2ST, IM OR SUBCUT: ICD-10-PCS | Mod: S$GLB,,, | Performed by: FAMILY MEDICINE

## 2022-01-19 PROCEDURE — 3075F SYST BP GE 130 - 139MM HG: CPT | Mod: CPTII,S$GLB,, | Performed by: FAMILY MEDICINE

## 2022-01-19 PROCEDURE — 99999 PR PBB SHADOW E&M-EST. PATIENT-LVL III: CPT | Mod: PBBFAC,,, | Performed by: FAMILY MEDICINE

## 2022-01-19 PROCEDURE — 99214 PR OFFICE/OUTPT VISIT, EST, LEVL IV, 30-39 MIN: ICD-10-PCS | Mod: 25,S$GLB,, | Performed by: FAMILY MEDICINE

## 2022-01-19 PROCEDURE — 1159F MED LIST DOCD IN RCRD: CPT | Mod: CPTII,S$GLB,, | Performed by: FAMILY MEDICINE

## 2022-01-19 PROCEDURE — 3008F BODY MASS INDEX DOCD: CPT | Mod: CPTII,S$GLB,, | Performed by: FAMILY MEDICINE

## 2022-01-19 PROCEDURE — 3075F PR MOST RECENT SYSTOLIC BLOOD PRESS GE 130-139MM HG: ICD-10-PCS | Mod: CPTII,S$GLB,, | Performed by: FAMILY MEDICINE

## 2022-01-19 PROCEDURE — 96372 THER/PROPH/DIAG INJ SC/IM: CPT | Mod: S$GLB,,, | Performed by: FAMILY MEDICINE

## 2022-01-19 PROCEDURE — 3008F PR BODY MASS INDEX (BMI) DOCUMENTED: ICD-10-PCS | Mod: CPTII,S$GLB,, | Performed by: FAMILY MEDICINE

## 2022-01-19 PROCEDURE — 99999 PR PBB SHADOW E&M-EST. PATIENT-LVL III: ICD-10-PCS | Mod: PBBFAC,,, | Performed by: FAMILY MEDICINE

## 2022-01-19 PROCEDURE — 3078F DIAST BP <80 MM HG: CPT | Mod: CPTII,S$GLB,, | Performed by: FAMILY MEDICINE

## 2022-01-19 RX ORDER — SILDENAFIL 100 MG/1
100 TABLET, FILM COATED ORAL DAILY PRN
Qty: 30 TABLET | Refills: 5 | Status: SHIPPED | OUTPATIENT
Start: 2022-01-19 | End: 2022-01-19

## 2022-01-19 RX ORDER — TRIAMCINOLONE ACETONIDE 40 MG/ML
40 INJECTION, SUSPENSION INTRA-ARTICULAR; INTRAMUSCULAR ONCE
Status: COMPLETED | OUTPATIENT
Start: 2022-01-19 | End: 2022-01-19

## 2022-01-19 RX ORDER — PREDNISONE 5 MG/1
TABLET ORAL
Qty: 20 TABLET | Refills: 0 | Status: SHIPPED | OUTPATIENT
Start: 2022-01-19

## 2022-01-19 RX ORDER — BETAMETHASONE VALERATE 1.2 MG/G
CREAM TOPICAL 2 TIMES DAILY
Qty: 60 G | Refills: 2 | Status: SHIPPED | OUTPATIENT
Start: 2022-01-19 | End: 2023-02-07 | Stop reason: SDUPTHER

## 2022-01-19 RX ORDER — SILDENAFIL 100 MG/1
100 TABLET, FILM COATED ORAL DAILY PRN
Qty: 30 TABLET | Refills: 5 | Status: SHIPPED | OUTPATIENT
Start: 2022-01-19 | End: 2023-01-19

## 2022-01-19 RX ORDER — TRIAMCINOLONE ACETONIDE 40 MG/ML
40 INJECTION, SUSPENSION INTRA-ARTICULAR; INTRAMUSCULAR ONCE
Status: DISCONTINUED | OUTPATIENT
Start: 2022-01-19 | End: 2022-01-19

## 2022-01-19 RX ORDER — BETAMETHASONE VALERATE 1.2 MG/G
CREAM TOPICAL 2 TIMES DAILY
Qty: 60 G | Refills: 2 | Status: SHIPPED | OUTPATIENT
Start: 2022-01-19 | End: 2022-01-19

## 2022-01-19 RX ORDER — PREDNISONE 5 MG/1
TABLET ORAL
Qty: 20 TABLET | Refills: 0 | Status: SHIPPED | OUTPATIENT
Start: 2022-01-19 | End: 2022-01-19

## 2022-01-19 RX ADMIN — TRIAMCINOLONE ACETONIDE 40 MG: 40 INJECTION, SUSPENSION INTRA-ARTICULAR; INTRAMUSCULAR at 08:01

## 2022-01-19 NOTE — TELEPHONE ENCOUNTER
----- Message from Kasandra Lau sent at 1/19/2022  9:59 AM CST -----  Contact: wife [phone]  Pt was in today and may have left his insurance card at the  please call back to let the pt know if the card was located.Thank You

## 2022-01-19 NOTE — PROGRESS NOTES
Verified pt ID using name and . NKDA. Administered 40 mg kenalog in left VG per physician order using aseptic technique. Aspirated and no blood return noted. Pt tolerated well with no adverse reactions noted.

## 2022-01-19 NOTE — PROGRESS NOTES
Subjective:       Patient ID: Michelle Hankins is a 61 y.o. male.    Chief Complaint: Rash (Mid back and left side)    HPI:  61-year-old male ---skin rash--x1 week--pruritus--dry skin mainly on the back and buttocks--no new soaps no new detergent--no new medicine--no new clothes--no yd work.  Patient has had this before responded well to Kenalog injection with prednisone and steroid cream--appears to be eczema if becomes a persistent problem may benefit from seeing Dermatology.       Wants Viagra for erectile dysfuction     ROS:  Skin: no psoriasis, +eczema,no  skin cancer--see HPI above   HEENT: No headache, ocular pain, blurred vision, diplopia, + occasional epistaxis no hoarseness change in voice, thyroid trouble history of chronic ethmoid sinusitis and nasal polyps  Lung: No pneumonia, asthma, Tb, wheezing, SOB, no smoking  Heart: No chest pain, ankle edema, palpitations, MI, mary murmur, +hypertension,+ hyperlipidemia--no stent bypass arrhythmia  Abdomen:  No nausea, vomiting, diarrhea, constipation, ulcers, hepatitis, gallbladder disease, melena, hematochezia, hematemesis  : no UTI, renal disease, stones BPH better  MS: no fractures, O/A, lupus, rheumatoid, gout-osteoarthritis left knee--getting injections in the knee--wants to do left  knee replacement  --history of back surgery getting injections in the back--history of a trigger finger   Neuro: No dizziness, LOC, seizures   No diabetes, no anemia, no anxiety, no depression  , 1 daughter, disabled--due to back, lives with wife     Objective:   Physical Exam:  General: Well nourished, well developed, no acute distress  Skin:  Dry scaly patches back and buttock areas none on the trunk anteriorly none in the extremities eczema  HEENT: Eyes PERRLA, EOM intact, nose patent-unable see any specific lesions, throat non-erythematous ears TMs clear  NECK: Supple, no bruits, No JVD, no nodes  Lungs: Clear, no rales, rhonchi, wheezing  Heart: Regular rate and  rhythm, no murmurs, gallops, or rubs  Abdomen: flat, bowel sounds positive, no tenderness, or organomegaly  MS:  Tenderness left knee pain with flexion extension but overall range of motion muscle strength intact able squat arise without difficulty pain lower back anterior flexion 10° extension 10° lateral flexion rotation 10°  Neuro: Alert, CN intact, oriented X 3 Romberg negative heel-toe intact  Extremities: No cyanosis, clubbing, or edema         Assessment:       1. Eczema, unspecified type    2. Hypertension, unspecified type    3. Hyperlipidemia, unspecified hyperlipidemia type    4. Memory loss    5. DDD (degenerative disc disease), lumbar    6. History of back surgery    7. Osteoarthritis of left knee, unspecified osteoarthritis type    8. Benign prostatic hyperplasia, unspecified whether lower urinary tract symptoms present        Plan:       Eczema, unspecified type  -     T4, Free; Future; Expected date: 01/19/2022  -     TSH; Future; Expected date: 01/19/2022    Hypertension, unspecified type  -     T4, Free; Future; Expected date: 01/19/2022  -     TSH; Future; Expected date: 01/19/2022    Hyperlipidemia, unspecified hyperlipidemia type  -     CBC Auto Differential; Future; Expected date: 01/19/2022  -     Comprehensive Metabolic Panel; Future; Expected date: 01/19/2022  -     Lipid Panel; Future; Expected date: 01/19/2022  -     T4, Free; Future; Expected date: 01/19/2022  -     TSH; Future; Expected date: 01/19/2022    Memory loss  -     T4, Free; Future; Expected date: 01/19/2022  -     TSH; Future; Expected date: 01/19/2022    DDD (degenerative disc disease), lumbar  -     T4, Free; Future; Expected date: 01/19/2022  -     TSH; Future; Expected date: 01/19/2022    History of back surgery  -     T4, Free; Future; Expected date: 01/19/2022  -     TSH; Future; Expected date: 01/19/2022    Osteoarthritis of left knee, unspecified osteoarthritis type  -     T4, Free; Future; Expected date: 01/19/2022  -      TSH; Future; Expected date: 01/19/2022    Benign prostatic hyperplasia, unspecified whether lower urinary tract symptoms present  -     Prostate Specific Antigen, Diagnostic; Future; Expected date: 01/19/2022  -     T4, Free; Future; Expected date: 01/19/2022  -     TSH; Future; Expected date: 01/19/2022    Other orders  -     Discontinue: triamcinolone acetonide injection 40 mg  -     Discontinue: predniSONE (DELTASONE) 5 MG tablet; 4 po qd x 2, 3 po qd x2, 2 po qd x2, 1 po qd x2  Dispense: 20 tablet; Refill: 0  -     Discontinue: betamethasone valerate 0.1% (VALISONE) 0.1 % Crea; Apply topically 2 (two) times daily.  Dispense: 60 g; Refill: 2  -     Discontinue: sildenafiL (VIAGRA) 100 MG tablet; Take 1 tablet (100 mg total) by mouth daily as needed for Erectile Dysfunction.  Dispense: 30 tablet; Refill: 5  -     triamcinolone acetonide injection 40 mg  -     predniSONE (DELTASONE) 5 MG tablet; 4 po qd x 2, 3 po qd x2, 2 po qd x2, 1 po qd x2  Dispense: 20 tablet; Refill: 0  -     sildenafiL (VIAGRA) 100 MG tablet; Take 1 tablet (100 mg total) by mouth daily as needed for Erectile Dysfunction.  Dispense: 30 tablet; Refill: 5  -     betamethasone valerate 0.1% (VALISONE) 0.1 % Crea; Apply topically 2 (two) times daily.  Dispense: 60 g; Refill: 2        Main Reason for Visit  Skin--dry patches buttock and back--Kenalog/prednisone taper/Valisone  Erectile dysfunction Viagra 100 mg 1 p.o. q.d.  Other medical issues  Hypertension/hyperlipidemia----low-sodium low-fat diet Needs arm blood pressure cuff take pressure daily Needs blood pressure be 140/90 or less greater than 90/60 Cozaar 50 mg 1 p.o. q.d. for blood pressure get lab see if needs to be on cholesterol medication--blood pressure 162/86  Osteoarthritis left knee--told needed knee surgery prefers not to get it yet--getting injections in the every 2-3 months Keep appointment with orthopedist  Right shoulder pain history of right shoulder surgery had shoulder  tom Keep appointment with orthopedist  History of BPH states doing fairly well now half prostate surgery  Lab due bnow do 2 weeks after off steroids --CBCs CMP lipid T4 TSH PSA  Health maintenance--colorectal screen tetanus shingles flu

## 2022-03-14 DIAGNOSIS — Z12.11 COLON CANCER SCREENING: ICD-10-CM

## 2022-05-05 ENCOUNTER — PATIENT MESSAGE (OUTPATIENT)
Dept: ADMINISTRATIVE | Facility: HOSPITAL | Age: 62
End: 2022-05-05
Payer: MEDICARE

## 2022-05-31 ENCOUNTER — PATIENT MESSAGE (OUTPATIENT)
Dept: ADMINISTRATIVE | Facility: HOSPITAL | Age: 62
End: 2022-05-31
Payer: MEDICARE

## 2022-06-24 ENCOUNTER — IMMUNIZATION (OUTPATIENT)
Dept: PRIMARY CARE CLINIC | Facility: CLINIC | Age: 62
End: 2022-06-24
Payer: MEDICARE

## 2022-06-24 DIAGNOSIS — Z23 NEED FOR VACCINATION: Primary | ICD-10-CM

## 2022-06-24 PROCEDURE — 91305 COVID-19, MRNA, LNP-S, PF, 30 MCG/0.3 ML DOSE VACCINE (PFIZER): CPT | Mod: PBBFAC | Performed by: FAMILY MEDICINE

## 2022-07-20 ENCOUNTER — PATIENT OUTREACH (OUTPATIENT)
Dept: ADMINISTRATIVE | Facility: HOSPITAL | Age: 62
End: 2022-07-20
Payer: MEDICARE

## 2022-09-21 ENCOUNTER — PATIENT OUTREACH (OUTPATIENT)
Dept: ADMINISTRATIVE | Facility: HOSPITAL | Age: 62
End: 2022-09-21
Payer: MEDICARE

## 2022-10-21 ENCOUNTER — IMMUNIZATION (OUTPATIENT)
Dept: PRIMARY CARE CLINIC | Facility: CLINIC | Age: 62
End: 2022-10-21
Payer: MEDICARE

## 2022-10-21 DIAGNOSIS — Z23 NEED FOR VACCINATION: Primary | ICD-10-CM

## 2022-10-21 PROCEDURE — 91312 COVID-19, MRNA, LNP-S, BIVALENT BOOSTER, PF, 30 MCG/0.3 ML DOSE: CPT | Mod: S$GLB,,, | Performed by: EMERGENCY MEDICINE

## 2022-10-21 PROCEDURE — 0124A COVID-19, MRNA, LNP-S, BIVALENT BOOSTER, PF, 30 MCG/0.3 ML DOSE: CPT | Mod: PBBFAC | Performed by: EMERGENCY MEDICINE

## 2022-10-21 PROCEDURE — 91312 COVID-19, MRNA, LNP-S, BIVALENT BOOSTER, PF, 30 MCG/0.3 ML DOSE: ICD-10-PCS | Mod: S$GLB,,, | Performed by: EMERGENCY MEDICINE

## 2022-12-02 ENCOUNTER — PATIENT OUTREACH (OUTPATIENT)
Dept: ADMINISTRATIVE | Facility: HOSPITAL | Age: 62
End: 2022-12-02
Payer: MEDICARE

## 2022-12-02 NOTE — PROGRESS NOTES
Health Maintenance Due   Topic Date Due    Colorectal Cancer Screening  Never done    Shingles Vaccine (1 of 2) Never done

## 2023-01-24 ENCOUNTER — PATIENT OUTREACH (OUTPATIENT)
Dept: ADMINISTRATIVE | Facility: HOSPITAL | Age: 63
End: 2023-01-24
Payer: MEDICARE

## 2023-01-24 NOTE — PROGRESS NOTES
Health Maintenance Due   Topic Date Due    TETANUS VACCINE  Never done    Colorectal Cancer Screening  Never done    Shingles Vaccine (1 of 2) Never done        Chart review done.   HM updated.   Immunizations reviewed & updated.   Care Everywhere updated.

## 2023-02-07 ENCOUNTER — OFFICE VISIT (OUTPATIENT)
Dept: PRIMARY CARE CLINIC | Facility: CLINIC | Age: 63
End: 2023-02-07
Payer: MEDICARE

## 2023-02-07 DIAGNOSIS — Z98.890 HISTORY OF BACK SURGERY: ICD-10-CM

## 2023-02-07 DIAGNOSIS — M51.36 DDD (DEGENERATIVE DISC DISEASE), LUMBAR: ICD-10-CM

## 2023-02-07 DIAGNOSIS — Z13.1 SCREENING FOR DIABETES MELLITUS: Primary | ICD-10-CM

## 2023-02-07 DIAGNOSIS — L30.8 PSORIASIFORM DERMATITIS: ICD-10-CM

## 2023-02-07 DIAGNOSIS — Z23 NEED FOR TD VACCINE: ICD-10-CM

## 2023-02-07 DIAGNOSIS — Z98.890 HISTORY OF PROSTATE SURGERY: ICD-10-CM

## 2023-02-07 DIAGNOSIS — Z98.890 HISTORY OF SHOULDER SURGERY: ICD-10-CM

## 2023-02-07 DIAGNOSIS — I10 HYPERTENSION, UNSPECIFIED TYPE: ICD-10-CM

## 2023-02-07 DIAGNOSIS — E78.00 PURE HYPERCHOLESTEROLEMIA: ICD-10-CM

## 2023-02-07 PROCEDURE — 3008F PR BODY MASS INDEX (BMI) DOCUMENTED: ICD-10-PCS | Mod: CPTII,S$GLB,, | Performed by: FAMILY MEDICINE

## 2023-02-07 PROCEDURE — 1159F PR MEDICATION LIST DOCUMENTED IN MEDICAL RECORD: ICD-10-PCS | Mod: CPTII,S$GLB,, | Performed by: FAMILY MEDICINE

## 2023-02-07 PROCEDURE — 3079F PR MOST RECENT DIASTOLIC BLOOD PRESSURE 80-89 MM HG: ICD-10-PCS | Mod: CPTII,S$GLB,, | Performed by: FAMILY MEDICINE

## 2023-02-07 PROCEDURE — 99214 OFFICE O/P EST MOD 30 MIN: CPT | Mod: S$GLB,,, | Performed by: FAMILY MEDICINE

## 2023-02-07 PROCEDURE — 3079F DIAST BP 80-89 MM HG: CPT | Mod: CPTII,S$GLB,, | Performed by: FAMILY MEDICINE

## 2023-02-07 PROCEDURE — 1159F MED LIST DOCD IN RCRD: CPT | Mod: CPTII,S$GLB,, | Performed by: FAMILY MEDICINE

## 2023-02-07 PROCEDURE — 3077F PR MOST RECENT SYSTOLIC BLOOD PRESSURE >= 140 MM HG: ICD-10-PCS | Mod: CPTII,S$GLB,, | Performed by: FAMILY MEDICINE

## 2023-02-07 PROCEDURE — 3077F SYST BP >= 140 MM HG: CPT | Mod: CPTII,S$GLB,, | Performed by: FAMILY MEDICINE

## 2023-02-07 PROCEDURE — 99999 PR PBB SHADOW E&M-EST. PATIENT-LVL III: CPT | Mod: PBBFAC,,, | Performed by: FAMILY MEDICINE

## 2023-02-07 PROCEDURE — 99999 PR PBB SHADOW E&M-EST. PATIENT-LVL III: ICD-10-PCS | Mod: PBBFAC,,, | Performed by: FAMILY MEDICINE

## 2023-02-07 PROCEDURE — 99214 PR OFFICE/OUTPT VISIT, EST, LEVL IV, 30-39 MIN: ICD-10-PCS | Mod: S$GLB,,, | Performed by: FAMILY MEDICINE

## 2023-02-07 PROCEDURE — 3008F BODY MASS INDEX DOCD: CPT | Mod: CPTII,S$GLB,, | Performed by: FAMILY MEDICINE

## 2023-02-07 RX ORDER — INFLUENZA A VIRUS A/DELAWARE/55/2019 CVR-45 (H1N1) ANTIGEN (MDCK CELL DERIVED, PROPIOLACTONE INACTIVATED), INFLUENZA A VIRUS A/DARWIN/11/2021 (H3N2) ANTIGEN (MDCK CELL DERIVED, PROPIOLACTONE INACTIVATED), INFLUENZA B VIRUS B/SINGAPORE/WUH4618/2021 ANTIGEN (MDCK CELL DERIVED, PROPIOLACTONE INACTIVATED), INFLUENZA B VIRUS B/SINGAPORE/INFTT-16-0610/2016 ANTIGEN (MDCK CELL DERIVED, PROPIOLACTONE INACTIVATED) 15; 15; 15; 15 UG/.5ML; UG/.5ML; UG/.5ML; UG/.5ML
INJECTION, SUSPENSION INTRAMUSCULAR
COMMUNITY
Start: 2022-09-12

## 2023-02-07 RX ORDER — LOSARTAN POTASSIUM 50 MG/1
50 TABLET ORAL DAILY
Qty: 90 TABLET | Refills: 3 | Status: SHIPPED | OUTPATIENT
Start: 2023-02-07 | End: 2024-02-07

## 2023-02-07 RX ORDER — BETAMETHASONE VALERATE 1.2 MG/G
CREAM TOPICAL 2 TIMES DAILY
Qty: 60 G | Refills: 2 | Status: SHIPPED | OUTPATIENT
Start: 2023-02-07

## 2023-02-07 NOTE — PROGRESS NOTES
Subjective:       Patient ID: Michelle Hankins is a 62 y.o. male.    Chief Complaint: Back Pain    HPI:  62-year-old male --patient has skin rash--rashes now generalized--no new meds--no new foods---no new clothes--no new soaps or detergents patient aware of--not working in yard.  No lupus rheumatoid gout.  Patient was treated with a cream and with some steroid rashes actually spread now onto the legs--needs see dermatology   Rash x 2-3 months --on oxycodone times years  History hypertension not on medication blood pressure 160/80  Hx HLP no meds   Hx prostate surgery dueb BPH with obstruction   Hx back surgery OA left knee Chronic right shoulder   Weight loss 180 to 169    ROS:  Skin: no psoriasis, +eczema,no  skin cancer--see HPI above   HEENT: No headache, ocular pain, blurred vision, diplopia, + occasional epistaxis no hoarseness change in voice, thyroid trouble history of chronic ethmoid sinusitis and nasal polyps  Lung: No pneumonia, asthma, Tb, wheezing, SOB, no smoking  Heart: No chest pain, ankle edema, palpitations, MI, mary murmur, +hypertension,+ hyperlipidemia--no stent bypass arrhythmia  Abdomen:  No nausea, vomiting, diarrhea, constipation, ulcers, hepatitis, gallbladder disease, melena, hematochezia, hematemesis  : no UTI, renal disease, stones BPH better  MS: no fractures, O/A, lupus, rheumatoid, gout-osteoarthritis left knee--getting injections in the knee--wants to do left  knee replacement  --history of back surgery getting injections in the back--history of a trigger finger   Neuro: No dizziness, LOC, seizures   No diabetes, no anemia, no anxiety, no depression  , 1 daughter, disabled--due to back, lives with wife     Objective:   Physical Exam:  General: Well nourished, well developed, no acute distress  Skin:  Dry scaly patches back and buttock areas initially now on arms and legs   HEENT: Eyes PERRLA, EOM intact, nose patent-unable see any specific lesions, throat non-erythematous  ears TMs clear  NECK: Supple, no bruits, No JVD, no nodes  Lungs: Clear, no rales, rhonchi, wheezing  Heart: Regular rate and rhythm, no murmurs, gallops, or rubs  Abdomen: flat, bowel sounds positive, no tenderness, or organomegaly  MS:  Tenderness left knee pain with flexion extension but overall range of motion muscle strength intact able squat arise without difficulty pain lower back anterior flexion 10° extension 10° lateral flexion rotation 10°  Neuro: Alert, CN intact, oriented X 3 Romberg negative heel-toe intact  Extremities: No cyanosis, clubbing, or edema         Assessment:       1. Screening for diabetes mellitus    2. Need for Td vaccine    3. Psoriasiform dermatitis    4. DDD (degenerative disc disease), lumbar    5. Pure hypercholesterolemia    6. Hypertension, unspecified type    7. History of prostate surgery    8. History of back surgery    9. History of shoulder surgery        Plan:       Screening for diabetes mellitus    Need for Td vaccine    Psoriasiform dermatitis    DDD (degenerative disc disease), lumbar    Pure hypercholesterolemia    Hypertension, unspecified type    History of prostate surgery    History of back surgery    History of shoulder surgery        Main Reason for Visit  Skin--dry patches buttock and back--Kenalog/prednisone taper/Valisone--last visit --now rash spread to arms and legs   Erectile dysfunction Viagra 100 mg 1 p.o. q.d.  Other medical issues  Hypertension/hyperlipidemia----low-sodium low-fat diet Needs arm blood pressure cuff take pressure daily Needs blood pressure be 140/90 or less greater than 90/60 Cozaar 50 mg 1 p.o. q.d. for blood pressure get lab see if needs to be on cholesterol /80  Osteoarthritis left knee--told needed knee surgery prefers not to get it yet--getting injections in the every 2-3 months Keep appointment with orthopedist  Chronic back pain sees chronic pain clinic on oxycodone has epidural steroid injection history of shoulder and back  surgery  History of BPH states doing fairly well now half prostate surgery  Lab -CBCs CMP lipid T4 TSH PSA  Health maintenance--colorectal screen tetanus shingles flu

## 2023-02-08 VITALS
SYSTOLIC BLOOD PRESSURE: 146 MMHG | WEIGHT: 173.94 LBS | RESPIRATION RATE: 18 BRPM | OXYGEN SATURATION: 98 % | HEART RATE: 71 BPM | HEIGHT: 71 IN | DIASTOLIC BLOOD PRESSURE: 78 MMHG | BODY MASS INDEX: 24.35 KG/M2 | TEMPERATURE: 97 F

## 2023-02-23 ENCOUNTER — TELEPHONE (OUTPATIENT)
Dept: PRIMARY CARE CLINIC | Facility: CLINIC | Age: 63
End: 2023-02-23
Payer: MEDICARE

## 2023-02-23 NOTE — TELEPHONE ENCOUNTER
----- Message from Claire Curry sent at 2/23/2023 10:01 AM CST -----  Contact: María Elena/Spouse [phone]  Patient missed his BP appointment yesterday and would like to reschedule.      Please call and advise.    Thank You

## 2023-02-24 ENCOUNTER — TELEPHONE (OUTPATIENT)
Dept: PRIMARY CARE CLINIC | Facility: CLINIC | Age: 63
End: 2023-02-24
Payer: MEDICARE

## 2023-02-24 NOTE — TELEPHONE ENCOUNTER
Returned call to patient in regards to message. Patient wife was requesting an appointment for missed bp check. Patient is schedule for 02/28/2023.

## 2023-02-24 NOTE — TELEPHONE ENCOUNTER
----- Message from Claire Curry sent at 2/24/2023  9:09 AM CST -----  Contact: María Elena/Spouse [phone]  Patient missed his BP check up and would like it rescheduled     Please call and advise.    Thank You

## 2023-02-28 ENCOUNTER — CLINICAL SUPPORT (OUTPATIENT)
Dept: PRIMARY CARE CLINIC | Facility: CLINIC | Age: 63
End: 2023-02-28
Payer: MEDICARE

## 2023-02-28 VITALS — SYSTOLIC BLOOD PRESSURE: 134 MMHG | HEART RATE: 60 BPM | DIASTOLIC BLOOD PRESSURE: 78 MMHG | OXYGEN SATURATION: 98 %

## 2023-02-28 DIAGNOSIS — I10 HYPERTENSION, UNSPECIFIED TYPE: Primary | ICD-10-CM

## 2023-02-28 PROCEDURE — 99999 PR PBB SHADOW E&M-EST. PATIENT-LVL I: ICD-10-PCS | Mod: PBBFAC,,,

## 2023-02-28 PROCEDURE — 99999 PR PBB SHADOW E&M-EST. PATIENT-LVL I: CPT | Mod: PBBFAC,,,

## 2023-02-28 NOTE — PROGRESS NOTES
Verified pt ID using name and . Obtained bp: 134/78, p:60, and sp02:98. Notified physician of results. Instructed pt to continue taking BP medications . Pt verbalized understanding

## 2023-04-21 ENCOUNTER — PATIENT MESSAGE (OUTPATIENT)
Dept: ADMINISTRATIVE | Facility: HOSPITAL | Age: 63
End: 2023-04-21
Payer: MEDICARE

## 2023-05-06 ENCOUNTER — PATIENT MESSAGE (OUTPATIENT)
Dept: ADMINISTRATIVE | Facility: HOSPITAL | Age: 63
End: 2023-05-06
Payer: MEDICARE

## 2023-06-22 DIAGNOSIS — I10 HYPERTENSION: ICD-10-CM

## 2023-06-27 ENCOUNTER — PATIENT MESSAGE (OUTPATIENT)
Dept: ADMINISTRATIVE | Facility: HOSPITAL | Age: 63
End: 2023-06-27
Payer: MEDICARE

## 2023-09-18 ENCOUNTER — PATIENT MESSAGE (OUTPATIENT)
Dept: PRIMARY CARE CLINIC | Facility: CLINIC | Age: 63
End: 2023-09-18
Payer: MEDICARE

## 2023-10-18 ENCOUNTER — PATIENT MESSAGE (OUTPATIENT)
Dept: CARDIOLOGY | Facility: CLINIC | Age: 63
End: 2023-10-18
Payer: MEDICARE

## 2024-01-17 ENCOUNTER — PATIENT OUTREACH (OUTPATIENT)
Dept: ADMINISTRATIVE | Facility: HOSPITAL | Age: 64
End: 2024-01-17

## 2024-01-17 ENCOUNTER — PATIENT MESSAGE (OUTPATIENT)
Dept: ADMINISTRATIVE | Facility: HOSPITAL | Age: 64
End: 2024-01-17

## 2024-01-17 NOTE — PROGRESS NOTES
Health Maintenance Due   Topic Date Due    High Dose Statin  Never done    Colorectal Cancer Screening  Never done    RSV Vaccine (Age 60+ and Pregnant patients) (1 - 1-dose 60+ series) Never done     Population Health Chart Review & Patient Outreach Details      Further Action Needed If Patient Returns Outreach:      MA Gap report  reviewed, patient overdue for colon cancer screening. Portal message sent to patient.       Updates Requested / Reviewed:     [x]  Care Everywhere    []     []  External Sources ([facility], [facility], [facility], etc.)    [] [facility]   [] [facility]   [] Other:    [x]  Care Team Updated   []  Removed  or Duplicate Orders   [x]  Immunization Reconciliation Completed / Queried    [x] [place]   [] [place]   [] [place]   [] [place]      Health Maintenance Topics Addressed and Outreach Outcomes / Actions Taken:             Breast Cancer Screening []  Mammogram Order Placed    []  Mammogram Screening Scheduled    []  External Records Requested & Care Team Updated if Applicable    []  External Records Uploaded & Care Team Updated if Applicable    []  Pt Declined Scheduling Mammogram    []  Pt Will Schedule with External Provider / Order Routed & Care Team Updated if Applicable              Cervical Cancer Screening []  Pap Smear Scheduled in Primary Care or OBGYN    []  External Records Requested & Care Team Updated if Applicable       []  External Records Uploaded, Care Team Updated, & History Updated if Applicable    []  Patient Declined Scheduling Pap Smear    []  Patient Will Schedule with External Provider & Care Team Updated if Applicable                  Colorectal Cancer Screening []  Colonoscopy Case Request / Referral / Home Test Order Placed    []  External Records Requested & Care Team Updated if Applicable    []  External Records Uploaded, Care Team Updated, & History Updated if Applicable    []  Patient Declined Completing Colon Cancer Screening    []  Patient Will  Schedule with External Provider & Care Team Updated if Applicable    []  Fit Kit Mailed (add the SmartPhrase under additional notes)    []  Reminded Patient to Complete Home Test                Diabetic Eye Exam []  Eye Exam Screening Order Placed    []  Eye Camera Scheduled or Optometry/Ophthalmology Referral Placed    []  External Records Requested & Care Team Updated if Applicable    []  External Records Uploaded, Care Team Updated, & History Updated if Applicable    []  Patient Declined Scheduling Eye Exam    []  Patient Will Schedule with External Provider & Care Team Updated if Applicable             Blood Pressure Control []  Primary Care Follow Up Visit Scheduled     []  Remote Blood Pressure Reading Captured    []  Patient Declined Remote Reading or Scheduling Appt - Escalated to PCP    []  Patient Will Call Back or Send Portal Message with Reading                 HbA1c & Other Labs []  Overdue Lab(s) Ordered    []  Overdue Lab(s) Scheduled    []  External Records Uploaded & Care Team Updated if Applicable    []  Primary Care Follow Up Visit Scheduled     []  Reminded Patient to Complete A1c Home Test    []  Patient Declined Scheduling Labs or Will Call Back to Schedule    []  Patient Will Schedule with External Provider / Order Routed, & Care Team Updated if Applicable           Primary Care Appointment []  Primary Care Appt Scheduled    []  Patient Declined Scheduling or Will Call Back to Schedule    []  Pt Established with External Provider, Updated Care Team, & Informed Pt to Notify Payor if Applicable           Medication Adherence /    Statin Use []  Primary Care Appointment Scheduled    []  Patient Reminded to  Prescription    []  Patient Declined, Provider Notified if Needed    []  Sent Provider Message to Review to Evaluate Pt for Statin, Add Exclusion Dx Codes, Document   Exclusion in Problem List, Change Statin Intensity Level to Moderate or High Intensity if Applicable                 Osteoporosis Screening []  Dexa Order Placed    []  Dexa Appointment Scheduled    []  External Records Requested & Care Team Updated    []  External Records Uploaded, Care Team Updated, & History Updated if Applicable    []  Patient Declined Scheduling Dexa or Will Call Back to Schedule    []  Patient Will Schedule with External Provider / Order Routed & Care Team Updated if Applicable       Additional Notes:

## 2024-06-27 DIAGNOSIS — I10 HYPERTENSION: ICD-10-CM

## 2024-11-06 ENCOUNTER — OFFICE VISIT (OUTPATIENT)
Dept: PRIMARY CARE CLINIC | Facility: CLINIC | Age: 64
End: 2024-11-06
Payer: MEDICARE

## 2024-11-06 VITALS
HEIGHT: 71 IN | TEMPERATURE: 98 F | WEIGHT: 160.63 LBS | SYSTOLIC BLOOD PRESSURE: 118 MMHG | BODY MASS INDEX: 22.49 KG/M2 | OXYGEN SATURATION: 97 % | DIASTOLIC BLOOD PRESSURE: 76 MMHG | HEART RATE: 96 BPM

## 2024-11-06 DIAGNOSIS — R41.3 OTHER AMNESIA: ICD-10-CM

## 2024-11-06 DIAGNOSIS — G30.0 ALZHEIMER'S DISEASE WITH EARLY ONSET (CODE): ICD-10-CM

## 2024-11-06 DIAGNOSIS — N40.0 BPH WITHOUT URINARY OBSTRUCTION: ICD-10-CM

## 2024-11-06 DIAGNOSIS — R63.4 WEIGHT LOSS: ICD-10-CM

## 2024-11-06 DIAGNOSIS — I10 HYPERTENSION, UNSPECIFIED TYPE: ICD-10-CM

## 2024-11-06 DIAGNOSIS — R21 SKIN RASH: ICD-10-CM

## 2024-11-06 DIAGNOSIS — Z98.890 HISTORY OF PROSTATE SURGERY: ICD-10-CM

## 2024-11-06 DIAGNOSIS — R04.0 EPISTAXIS: ICD-10-CM

## 2024-11-06 DIAGNOSIS — M17.12 OSTEOARTHRITIS OF LEFT KNEE, UNSPECIFIED OSTEOARTHRITIS TYPE: ICD-10-CM

## 2024-11-06 DIAGNOSIS — E78.00 PURE HYPERCHOLESTEROLEMIA: ICD-10-CM

## 2024-11-06 DIAGNOSIS — L29.9 PRURITUS, UNSPECIFIED: ICD-10-CM

## 2024-11-06 DIAGNOSIS — R41.3 MEMORY LOSS: Primary | ICD-10-CM

## 2024-11-06 DIAGNOSIS — Z98.890 HISTORY OF BACK SURGERY: ICD-10-CM

## 2024-11-06 DIAGNOSIS — M51.369 DEGENERATION OF INTERVERTEBRAL DISC OF LUMBAR REGION, UNSPECIFIED WHETHER PAIN PRESENT: ICD-10-CM

## 2024-11-06 DIAGNOSIS — N40.0 BENIGN PROSTATIC HYPERPLASIA WITHOUT LOWER URINARY TRACT SYMPTOMS: ICD-10-CM

## 2024-11-06 PROCEDURE — 3078F DIAST BP <80 MM HG: CPT | Mod: CPTII,S$GLB,, | Performed by: FAMILY MEDICINE

## 2024-11-06 PROCEDURE — 3008F BODY MASS INDEX DOCD: CPT | Mod: CPTII,S$GLB,, | Performed by: FAMILY MEDICINE

## 2024-11-06 PROCEDURE — 3074F SYST BP LT 130 MM HG: CPT | Mod: CPTII,S$GLB,, | Performed by: FAMILY MEDICINE

## 2024-11-06 PROCEDURE — 99999 PR PBB SHADOW E&M-EST. PATIENT-LVL IV: CPT | Mod: PBBFAC,,, | Performed by: FAMILY MEDICINE

## 2024-11-06 PROCEDURE — 99214 OFFICE O/P EST MOD 30 MIN: CPT | Mod: S$GLB,,, | Performed by: FAMILY MEDICINE

## 2024-11-06 RX ORDER — DICLOFENAC SODIUM 75 MG/1
75 TABLET, DELAYED RELEASE ORAL 2 TIMES DAILY
Qty: 60 TABLET | Refills: 5 | Status: SHIPPED | OUTPATIENT
Start: 2024-11-06

## 2024-11-06 RX ORDER — CYPROHEPTADINE HYDROCHLORIDE 4 MG/1
TABLET ORAL
Qty: 90 TABLET | Refills: 3 | Status: SHIPPED | OUTPATIENT
Start: 2024-11-06

## 2024-11-06 NOTE — PATIENT INSTRUCTIONS
Memory loss  Routine lab CBCs CMP lipids T4 TSH chest x-ray EKG UA  MRI of the brain with and without gadolinium  Neurology consult  Weight loss  Periactin 4 mg 1 p.o. q.h.s. to increase appetite  Multivitamin--Theragran or centrum 1 p.o. q.d.  Dementia workup was borderline  Skin rash  See dermatologist appears to have a form of eczema--generalize arms legs buttock  Orthopedist  History of right knee pain  Patient needs to decide if wants needs surgery or not  Voltaren 75 mg 1 p.o. b.i.d. for knee pain  Follow-up with orthopedist  See me in 3 months

## 2024-11-06 NOTE — PROGRESS NOTES
Subjective:       Patient ID: Michelle Hankins is a 64 y.o. male.    Chief Complaint: Annual Exam    HPI 63 yo WM in for forgetting things annual visit left knee pain  Alot of family members-have had cancer--but patient not sure what type  Patient has lost some weight---Lost 13 lbs one year now 160 at one point was 185 but not sure how long ago--feels has decreased appetite--not eating much. Left knee pain --patient seen by orthopedist--was given an injection in the left knee--told needed left knee surgery---keeps pushing it back. Worried wife unable care for him with surgery --daughter wants pt move [place] .   Pt occas forgets things --fed rosetta Brannon --forgot how to get home.   History hypertension not on medication blood pressure 160/80  Hx HLP no meds   Hx prostate surgery dueb BPH with obstruction   Hx back surgery OA left knee Chronic right shoulder   Weight loss 180 to 169  Dementia workup--patient able to remember days--able to remember presidents---good judgment--poor subtractions--concrete thinking on proverbs but may be a language issue--able to remember 1 of 4 objects--did draw clock well    ROS:  Skin: no psoriasis, +eczema,no  skin cancer--rash--buttocks--thighs and lower legs--follicular type pattern possible heat rash  HEENT: No headache, ocular pain, blurred vision, diplopia, + occasional epistaxis no hoarseness change in voice, thyroid trouble history of chronic ethmoid sinusitis and nasal polyps  Lung: No pneumonia, asthma, Tb, wheezing, SOB, no smoking  Heart: No chest pain, ankle edema, palpitations, MI, mary murmur, +hypertension,+ hyperlipidemia--no stent bypass arrhythmia  Abdomen:  No nausea, vomiting, diarrhea, constipation, ulcers, hepatitis, gallbladder disease, melena, hematochezia, hematemesis  : no UTI, renal disease, stones BPH better  MS: no fractures, O/A, lupus, rheumatoid, gout-osteoarthritis left knee--getting injections in the knee--wants to do left  knee  replacement  --history of back surgery getting injections in the back--history of a trigger finger--main problem now is left knee   Neuro: No dizziness, LOC, seizures   No diabetes, no anemia, no anxiety, no depression  , 1 daughter, disabled--due to back, lives with wife     Objective:   Physical Exam:  General: Well nourished, well developed, no acute distress  Skin:  Dry scaly patches back and buttock areas initially now on arms and legs follicular areas excoriation  HEENT: Eyes PERRLA, EOM intact, nose patent-unable see any specific lesions, throat non-erythematous ears TMs clear  NECK: Supple, no bruits, No JVD, no nodes  Lungs: Clear, no rales, rhonchi, wheezing  Heart: Regular rate and rhythm, no murmurs, gallops, or rubs  Abdomen: flat, bowel sounds positive, no tenderness, or organomegaly  MS:  Tenderness left knee pain with flexion extension but overall range of motion muscle strength pretty good --told needed knee replacement  Neuro: Alert, CN intact, oriented X 3 Romberg negative heel-toe intact  Extremities: No cyanosis, clubbing, or edema         Assessment:       1. Memory loss    2. Skin rash    3. Osteoarthritis of left knee, unspecified osteoarthritis type    4. Hypertension, unspecified type    5. Pure hypercholesterolemia    6. History of prostate surgery    7. History of back surgery    8. Epistaxis    9. Degeneration of intervertebral disc of lumbar region, unspecified whether pain present    10. BPH without urinary obstruction    11. Alzheimer's disease with early onset (CODE)    12. Weight loss    13. Benign prostatic hyperplasia without lower urinary tract symptoms    14. Pruritus, unspecified    15. Other amnesia          Plan:       Memory loss  -     Ambulatory referral/consult to Neurology; Future; Expected date: 11/13/2024    Skin rash  -     Ambulatory referral/consult to Dermatology; Future; Expected date: 11/13/2024    Osteoarthritis of left knee, unspecified osteoarthritis  type    Hypertension, unspecified type  -     CBC Auto Differential; Future; Expected date: 11/06/2024  -     Comprehensive Metabolic Panel; Future; Expected date: 11/06/2024  -     EKG 12-lead; Future  -     X-Ray Chest PA And Lateral; Future; Expected date: 11/06/2024  -     URINALYSIS    Pure hypercholesterolemia  -     Lipid Panel; Future; Expected date: 11/06/2024    History of prostate surgery    History of back surgery    Epistaxis    Degeneration of intervertebral disc of lumbar region, unspecified whether pain present    BPH without urinary obstruction    Alzheimer's disease with early onset (CODE)    Weight loss    Benign prostatic hyperplasia without lower urinary tract symptoms  -     Prostate Specific Antigen, Diagnostic; Future; Expected date: 11/06/2024  -     T4, Free; Future; Expected date: 11/06/2024  -     TSH; Future; Expected date: 11/06/2024  -     MRI Brain W WO Contrast; Future; Expected date: 11/06/2024    Pruritus, unspecified  -     T4, Free; Future; Expected date: 11/06/2024  -     TSH; Future; Expected date: 11/06/2024    Other amnesia    Other orders  -     diclofenac (VOLTAREN) 75 MG EC tablet; Take 1 tablet (75 mg total) by mouth 2 (two) times daily.  Dispense: 60 tablet; Refill: 5  -     cyproheptadine (PERIACTIN) 4 mg tablet; One p.o. q.h.s. to help increase appetite  Dispense: 90 tablet; Refill: 3          Main Reason for Visit  Memory loss--patient got lost driving home from [facility]--several instances of memory issues--did dementia workup patient knew the date president good judgment poor some traction but may be educational poor Pravachol thinking may be a language issue remembered 104 objects did fair drawing a clock but only put 1 hand for 2:00 a.m.---neurology consult--MRI of the brain  Osteoarthritis left knee--patient told needed a needs surgery--patient gets very anxious about his rehab course where his wife would not be able to take care of him daughter lives in  [place]--will treat with Voltaren 75 b.i.d.--can get knee injected with orthopedist as he feels appropriate--patient told needs to decide if wants knee surgery or not then worry about rehab course  Weight loss--patient unable to be specific--has some decreased appetite--told to try multivitamin Theragran-M centrum--Periactin 4 mg q.h.s. to increase appetite--may benefit B12 may need ensure but presently looks well nourished had 160  Skin rash--see dermatology  Erectile dysfunction Viagra 100 mg 1 p.o. q.d.  Other medical issues  Hypertension blood pressure 118/76 on losartan 50  History of BPH states doing fairly well now half prostate surgery  Lab -CBCs CMP lipid T4 TSH PSA Chest Xray EKG   Health maintenance--colorectal screen tetanus shingles flu  Return 3 mo --check memory neurology consult--check rash dermatology consult---check knee being followed by orthopedist started on Voltaren--check lab in weight

## 2024-11-07 ENCOUNTER — TELEPHONE (OUTPATIENT)
Dept: PRIMARY CARE CLINIC | Facility: CLINIC | Age: 64
End: 2024-11-07
Payer: MEDICARE

## 2024-11-07 NOTE — TELEPHONE ENCOUNTER
----- Message from Cedric Garcia MD sent at 11/7/2024  3:18 PM CST -----  Call tell pt lab CBC CMP :jpid thyroid and PSA all WNL no new tx

## 2024-11-25 ENCOUNTER — TELEPHONE (OUTPATIENT)
Dept: PRIMARY CARE CLINIC | Facility: CLINIC | Age: 64
End: 2024-11-25
Payer: MEDICARE

## 2024-11-25 NOTE — TELEPHONE ENCOUNTER
----- Message from Margarita sent at 11/22/2024 12:25 PM CST -----  Contact: Ms Hankins @ Portal message w  [email or web address]     Patient is calling for Medical Advice regarding:Spouse called need the results for the MRI Brain W WO Contrast [[id number]]  sent to Dr Yoni Kang [phone]    How long has patient had these symptoms:    Pharmacy name and phone#:    Patient wants a call back or thru [facility]:call     Comments:    Please advise patient replies from provider may take up to 48 hours.

## 2025-02-10 ENCOUNTER — OFFICE VISIT (OUTPATIENT)
Dept: PRIMARY CARE CLINIC | Facility: CLINIC | Age: 65
End: 2025-02-10
Payer: MEDICARE

## 2025-02-10 VITALS
BODY MASS INDEX: 22.56 KG/M2 | SYSTOLIC BLOOD PRESSURE: 128 MMHG | DIASTOLIC BLOOD PRESSURE: 62 MMHG | HEIGHT: 71 IN | HEART RATE: 56 BPM | WEIGHT: 161.19 LBS | OXYGEN SATURATION: 98 %

## 2025-02-10 DIAGNOSIS — R63.4 WEIGHT LOSS: ICD-10-CM

## 2025-02-10 DIAGNOSIS — Z23 FLU VACCINE NEED: ICD-10-CM

## 2025-02-10 DIAGNOSIS — G30.0 ALZHEIMER'S DISEASE WITH EARLY ONSET (CODE): ICD-10-CM

## 2025-02-10 DIAGNOSIS — Z98.890 HISTORY OF BACK SURGERY: ICD-10-CM

## 2025-02-10 DIAGNOSIS — M17.12 OSTEOARTHRITIS OF LEFT KNEE, UNSPECIFIED OSTEOARTHRITIS TYPE: ICD-10-CM

## 2025-02-10 DIAGNOSIS — R41.3 MEMORY LOSS: Primary | ICD-10-CM

## 2025-02-10 DIAGNOSIS — I10 HYPERTENSION, UNSPECIFIED TYPE: ICD-10-CM

## 2025-02-10 DIAGNOSIS — E78.00 PURE HYPERCHOLESTEROLEMIA: ICD-10-CM

## 2025-02-10 DIAGNOSIS — Z98.890 HISTORY OF SHOULDER SURGERY: ICD-10-CM

## 2025-02-10 DIAGNOSIS — L30.9 ECZEMA, UNSPECIFIED TYPE: ICD-10-CM

## 2025-02-10 RX ORDER — CYPROHEPTADINE HYDROCHLORIDE 4 MG/1
TABLET ORAL
Qty: 90 TABLET | Refills: 3 | Status: SHIPPED | OUTPATIENT
Start: 2025-02-10

## 2025-02-10 RX ORDER — BETAMETHASONE VALERATE 1 MG/G
CREAM TOPICAL 2 TIMES DAILY
Qty: 60 G | Refills: 2 | Status: SHIPPED | OUTPATIENT
Start: 2025-02-10

## 2025-02-10 NOTE — PROGRESS NOTES
Subjective:       Patient ID: Michelle Hankins is a 64 y.o. male.    Chief Complaint: Follow-up and Memory Loss    HPI 64-year-old white male in for three-month follow-up--memory loss-flu vaccine/handicap license  Memory loss--patient still has not seen neurology yet--has appointment in March--MRI of the brain was basically within normal limits except for some old nasal surgery---still with short-term memory loss---in some long-term memory loss---can ask him to do something or tell him something in 2 minutes later he is not able to recall what was said---can have something in his hand in his looking for  Mother had a brain to  History of hypertension blood pressure 128/62--had been on losartan in the past on no blood pressure medications now and blood pressure is 5  Hyperlipidemia in the past---not on cholesterol medicine  History prostate surgery with BPH---no medications for that----has not seen urologist in awhile  History of back surgery osteoarthritis of the left knee and chronic right shoulder pain--on Voltaren 75 mg b.i.d. started last visit and on oxycodone--1 week ago had left shoulder and left knee injected by orthopedist  Weight loss 180--when 161 --started on Periactin--not on MVI   Did dementia workup last visit see below  Wants flu shot  History of the skin rash has appointment with Dermatology--needs refill of cream  Dementia workup--patient able to remember days--able to remember presidents---good judgment--poor subtractions--concrete thinking on proverbs but may be a language issue--able to remember 1 of 4 objects--did draw clock well    ROS:  Skin: no psoriasis, +eczema,no  skin cancer--rash--buttocks--thighs and lower legs--follicular type pattern possible heat rash  HEENT: No headache, ocular pain, blurred vision, diplopia, + occasional epistaxis no hoarseness change in voice, thyroid trouble history of chronic ethmoid sinusitis and nasal polyps  Lung: No pneumonia, asthma, Tb, wheezing, SOB, no  smoking  Heart: No chest pain, ankle edema, palpitations, MI, mary murmur, +hypertension,+ hyperlipidemia--no stent bypass arrhythmia  Abdomen:  No nausea, vomiting, diarrhea, constipation, ulcers, hepatitis, gallbladder disease, melena, hematochezia, hematemesis  : no UTI, renal disease, stones BPH better  MS: no fractures, O/A, lupus, rheumatoid, gout-osteoarthritis left shoulder left knee--had injection both 1 week ago--history of back surgery--anterior flexion 20° extension 10° lateral flexion rotation 10° straight leg lift pulling sensation lower back--some pain in the left biceps area area of a bruise--but good muscle strength good range of motion   Neuro: No dizziness, LOC, seizures   No diabetes, no anemia, no anxiety, no depression-  , 1 daughter, disabled--due to back, lives with wife     Objective:   Physical Exam:  General: Well nourished, well developed, no acute distress  Skin:  Dry scaly patches back and buttock areas initially now on arms and legs follicular areas excoriation  HEENT: Eyes PERRLA, EOM intact, nose patent-unable see any specific lesions, throat non-erythematous ears TMs clear  NECK: Supple, no bruits, No JVD, no nodes  Lungs: Clear, no rales, rhonchi, wheezing  Heart: Regular rate and rhythm, no murmurs, gallops, or rubs  Abdomen: flat, bowel sounds positive, no tenderness, or organomegaly  MS:  Tenderness left knee pain with flexion extension but overall range of motion muscle strength pretty good --told needed knee replacement  Neuro: Alert, CN intact, oriented X 3 Romberg negative heel-toe intact  Extremities: No cyanosis, clubbing, or edema         Assessment:       1. Memory loss    2. Alzheimer's disease with early onset (CODE)    3. Weight loss    4. Osteoarthritis of left knee, unspecified osteoarthritis type    5. Hypertension, unspecified type    6. Pure hypercholesterolemia    7. History of back surgery    8. History of shoulder surgery    9. Eczema, unspecified  type            Plan:       Memory loss    Alzheimer's disease with early onset (CODE)    Weight loss    Osteoarthritis of left knee, unspecified osteoarthritis type    Hypertension, unspecified type    Pure hypercholesterolemia    History of back surgery    History of shoulder surgery    Eczema, unspecified type  -     Ambulatory referral/consult to Dermatology; Future; Expected date: 02/17/2025    Other orders  -     betamethasone valerate 0.1% (VALISONE) 0.1 % Crea; Apply topically 2 (two) times daily.  Dispense: 60 g; Refill: 2  -     cyproheptadine (PERIACTIN) 4 mg tablet; One b.i.d. to stimulate appetite  Dispense: 90 tablet; Refill: 3            Main Reason for Visit  Skin rash buttocks--appears to be heat rash--refer to dermatology has not seen them yet--refill Valisone reconsult Dr. Shoemaker  Memory loss--MRI of the brain negative except for old sinus surgery--has appointment Neurology and March---which will be six-month--stool with recent and long-term memory loss---patient has difficulty remembering things---told can be affects sometimes of medication patient is on oxycodone  Weight loss---Periactin 4 mg increase to b.i.d.---centrum silver 1 p.o. q.d.---if still losing weight will add B12 shots--could add boost or ensure 1 can after each meal--told that sometimes pain medications suppressed hunger pain but caretaker states has been on pain medicine for 20 years --if weight loss continues will get GI evaluation  Osteoarthritis left knee----left shoulder pain--chronic back pain---recently saw orthopedist had left shoulder left knee injected  Erectile dysfunction Viagra 100 mg 1 p.o. q.d.  Hypertension blood pressuse 128/62 on losartan 50  History of BPH states doing fairly well now half prostate surgery  Lab -CBCs CMP lipid   OK flu shot  Return 3 mo --check memory neurology consult--check rash dermatology consult---check knee being followed by orthopedist started on Voltaren--check lab in weight if  continues to lose weight will get GI consult    Patient requesting handicap license

## 2025-03-05 ENCOUNTER — OFFICE VISIT (OUTPATIENT)
Dept: NEUROLOGY | Facility: CLINIC | Age: 65
End: 2025-03-05
Payer: MEDICARE

## 2025-03-05 VITALS
WEIGHT: 167.88 LBS | HEIGHT: 71 IN | HEART RATE: 62 BPM | SYSTOLIC BLOOD PRESSURE: 152 MMHG | DIASTOLIC BLOOD PRESSURE: 76 MMHG | OXYGEN SATURATION: 97 % | BODY MASS INDEX: 23.5 KG/M2

## 2025-03-05 DIAGNOSIS — G47.30 SLEEP APNEA, UNSPECIFIED TYPE: Primary | ICD-10-CM

## 2025-03-05 DIAGNOSIS — R41.3 MEMORY LOSS: ICD-10-CM

## 2025-03-05 PROCEDURE — 99999 PR PBB SHADOW E&M-EST. PATIENT-LVL V: CPT | Mod: PBBFAC,,, | Performed by: INTERNAL MEDICINE

## 2025-03-05 PROCEDURE — 99205 OFFICE O/P NEW HI 60 MIN: CPT | Mod: S$GLB,,, | Performed by: INTERNAL MEDICINE

## 2025-03-05 PROCEDURE — 3078F DIAST BP <80 MM HG: CPT | Mod: CPTII,S$GLB,, | Performed by: INTERNAL MEDICINE

## 2025-03-05 PROCEDURE — 1159F MED LIST DOCD IN RCRD: CPT | Mod: CPTII,S$GLB,, | Performed by: INTERNAL MEDICINE

## 2025-03-05 PROCEDURE — 3008F BODY MASS INDEX DOCD: CPT | Mod: CPTII,S$GLB,, | Performed by: INTERNAL MEDICINE

## 2025-03-05 PROCEDURE — 3077F SYST BP >= 140 MM HG: CPT | Mod: CPTII,S$GLB,, | Performed by: INTERNAL MEDICINE

## 2025-03-05 RX ORDER — OXYCODONE HYDROCHLORIDE 20 MG/1
1 TABLET ORAL
COMMUNITY
Start: 2025-02-03

## 2025-03-05 RX ORDER — MELATONIN 5 MG
5 CAPSULE ORAL NIGHTLY
Qty: 60 CAPSULE | Refills: 0 | Status: SHIPPED | OUTPATIENT
Start: 2025-03-05

## 2025-03-05 RX ORDER — MEMANTINE HYDROCHLORIDE 10 MG/1
10 TABLET ORAL 2 TIMES DAILY
Qty: 60 TABLET | Refills: 11 | Status: SHIPPED | OUTPATIENT
Start: 2025-03-05 | End: 2026-03-05

## 2025-03-05 NOTE — PROGRESS NOTES
GENERAL NEUROLOGY VISIT   03/05/2025  History:    Patient is a 64 y.o. male with medical history of hypertension, hyperlipidemia, pain presenting to the clinic for evaluation of cognitive impairment.  History obtained from patient, his wife and chart review.  Patient's daughter is available on the phone.    Wife states that she has at least been noticing changes over the last 5 years.  Patient lives with his wife.  Both state the most significant trouble is with regards to short-term memory.  Patient repeatedly ask the same questions, repeats himself multiple times throughout the day.  Wife has noticed some large gaps in memory, gives examples of instances where patient has completely forgotten situations like meeting someone recently.  Today's visit, patient did not remember that he had the MRI done in November.  Does have trouble remembering names of family members.  Denies any word-finding difficulty or trouble with maintaining conversation.  Continues to drive, although less in the last couple of months.  Has had 1 instance where he got lost in his usual route and had to call his wife to help him direct him back home.  Patient continues to cook on fire, has not had any accidents so far.  Finances have always been managed by the wife.  Wife states that he watches news for major part of the day, tries to keep up with the current affairs.  Does not have significant physical activities, continues to cut grass.  Denies any encouragement or help needed with his basic activities around the house and bathing.  Sleep is poor, wakes up multiple times at night.  Occasionally snores, wife states that he makes some choking noises.  Can fall asleep at the end of the day through boring passive activities.  Denies tremors.  Denies paranoia or hallucination.  Denies gait imbalance, however has had falls due to his left knee giving out from significant osteoarthritis.  Denies family history of dementia.  Worked as a  in  "the past.  Mood is okay, denies argumentativeness.  Of note, patient is on oxycodone 15 mg t.i.d. for chronic back pain prescribed by pain management.    Past Medical History:   Diagnosis Date    Back pain     BPH (benign prostatic hyperplasia)     Chronic back pain     High cholesterol     Hypertension        Past Surgical History:   Procedure Laterality Date    BACK SURGERY  2003    lumbar fusion    CYST REMOVAL Left     forehead    HERNIA REPAIR  2012    umbilical    PROSTATE SURGERY      SHOULDER SURGERY Left 2011    SINUS SURGERY  2014    stated tonsils removed also    TONSILLECTOMY      pt stated during sinus surgery    TRIGGER FINGER RELEASE         Social History[1]    Review of patient's allergies indicates:   Allergen Reactions    Hay fever and allergy relief        Medications Ordered Prior to Encounter[2]     Family history:  Denies family history of dementia    Review Of Systems     Constitutional Negative for fevers, chills, weigh loss   HEENT Negative for hearing loss, dysphagia, sore throat, diplopia   Respiratory Negative for shortness of breath, cough    Cardiovascular Negative for chest pain, palpitations    Gastrointestinal Negative for constipation, diarrhea, early satiety    Skin Negative for rashes    Musculoskeletal Negative for joint pains, myalgias.   Neurological See Above    Psychological Negative for sleep disturbances.    Heme/Lymph Negative for easy bruising, easy bleeding    Endocrine Negative for polyuria, polydypsia     Physical Exam:     Physical Examination  BP (!) 152/76 (BP Location: Left arm, Patient Position: Sitting)   Pulse 62   Ht 5' 11" (1.803 m)   Wt 76.1 kg (167 lb 14.1 oz)   SpO2 97%   BMI 23.41 kg/m²   Body mass index is 23.41 kg/m².      Neurological Exam  Mental Status:   Alert and oriented to name, date, location  MOCA 15/30    CN:   II, III, IV, VI: PERRL, EOMI, no nystagmus, fundus not visualized due to inadequate dilation.V: intact to fine touch, " temperature, pain.VII: symmetrical facial movement, nice smile, no drooping.VIII: grossly intact to hearing XII: tongue midline    Motor:   5/5 in all 4 extremities, no drift                Reflexes:   No Clonus, down going toes b/l.    Sensation: on both UEs and LEs    Intact to all modalities tested    Coordination:    Tremor: Absent.    Gait:    Normal      Interval/Previous Work-up:   MRI brain wo con 11/15/2024: mild global atrophy noted, more pronounced in the occipital region, minimal microvascular changes noted as well    Blood work 2024  TSH 0.612    Impression:   #cognitive impairment  MOCA 15/30.  Patient is independent of his basic ADLs aware needs significant assistance with his instrumental ADLs.  MRI brain reviewed with the patient, noted global atrophy which is more pronounced in the occipital region consistent with ?Posterior cortical atrophy.  Given deficits in multiple domains of cognition, underlying neurodegenerative condition particularly Alzheimer's disease is suspected.  We will obtain blood work to look for any other contributing reversible factors.  Patient does have symptoms for sleep apnea, sleep study ordered.  Discussed with the patient and wife regarding patient being on oxycodone which could be further affecting the cognition negatively.  Discuss regarding driving restriction given patient getting lost while driving and likely visual-spatial deficits, patient interested in driving evaluation by Occupational therapy.  Referral provided.    Plan:   Sleep apnea, unspecified type  -     Polysomnogram (CPAP will be added if patient meets diagnostic criteria.); Future    Memory loss  -     Ambulatory referral/consult to Neurology  -     Folate; Future; Expected date: 03/05/2025  -     Methylmalonic Acid, Serum; Future; Expected date: 03/05/2025  -     Vitamin B12; Future; Expected date: 03/05/2025  -     TSH; Future; Expected date: 03/05/2025  -     pTau-181, Plasma; Future; Expected date:  03/05/2025  -     Ambulatory referral/consult to Adult Neuropsychology; Future; Expected date: 03/12/2025  -     Ambulatory referral/consult to Physical/Occupational Therapy; Future; Expected date: 03/12/2025  -     memantine (NAMENDA) 10 MG Tab; Take 1 tablet (10 mg total) by mouth 2 (two) times daily.  Dispense: 60 tablet; Refill: 11  -     melatonin 5 mg Cap; Take 1 capsule (5 mg total) by mouth every evening.  Dispense: 60 capsule; Refill: 0        - counseled on importance of remaining mentally active. Encouraged to take on mentally stimulating tasks including reading, puzzles, coloring books.   - Counseled on importance of risk factor control / secondary stroke prevention such cardiovascular risk factors, hypertension (target < 140/90), hypercholesterolemia (target LDL < 70). Follow-up with PCP for specific management   - Counseled on monitoring for early recognition / treatment of behavioral and psychological symptoms of dementia, especially agitation, aggression, depression and psychosis  - Counseled on maintenance of good sleep hygiene / healthy dietary patterns, DASH diet as well as maintain physical activity.                   RTC three months or sooner if needed    Time spent on this encounter: 64 minutes. This includes face to face time and non-face to face time preparing to see the patient (eg, review of tests), obtaining and/or reviewing separately obtained history, documenting clinical information in the electronic or other health record, independently interpreting results and communicating results to the patient/family/caregiver, or care coordinator.     Disclaimer: This note was prepared using a voice recognition system and is likely to have sound alike errors.  Please call me with any questions.    Bettina Cerna MD  Neurology         [1]   Social History  Socioeconomic History    Marital status:    Tobacco Use    Smoking status: Former     Current packs/day: 0.00     Types: Cigarettes      Quit date: 1989     Years since quittin.1    Smokeless tobacco: Never   Substance and Sexual Activity    Alcohol use: No    Drug use: No    Sexual activity: Yes   [2]   Current Outpatient Medications on File Prior to Visit   Medication Sig Dispense Refill    betamethasone valerate 0.1% (VALISONE) 0.1 % Crea Apply topically 2 (two) times daily. 60 g 2    cyproheptadine (PERIACTIN) 4 mg tablet One b.i.d. to stimulate appetite 90 tablet 3    oxycodone (ROXICODONE) 15 MG Tab Take 10 mg by mouth 3 (three) times daily as needed.       oxyCODONE (ROXICODONE) 20 mg Tab immediate release tablet Take 1 tablet by mouth. Received 20 mg due to 15 mg not available.      diclofenac (VOLTAREN) 75 MG EC tablet Take 1 tablet (75 mg total) by mouth 2 (two) times daily. (Patient not taking: Reported on 3/5/2025) 60 tablet 5    losartan (COZAAR) 50 MG tablet Take 1 tablet (50 mg total) by mouth once daily. (Patient not taking: Reported on 3/5/2025) 90 tablet 3     Current Facility-Administered Medications on File Prior to Visit   Medication Dose Route Frequency Provider Last Rate Last Admin    influenza (Flulaval, Fluzone, Fluarix) 45 mcg/0.5 mL IM vaccine (> or = 6 mo) 0.5 mL  0.5 mL Intramuscular 1 time in Clinic/HOD Cedric Garcia MD

## 2025-03-13 ENCOUNTER — TELEPHONE (OUTPATIENT)
Dept: REHABILITATION | Facility: HOSPITAL | Age: 65
End: 2025-03-13
Payer: MEDICARE

## 2025-03-13 NOTE — TELEPHONE ENCOUNTER
Michelle was scheduled for a driving evaluation 3/18/2025, which was canceled today due to not wanting to go through with it.     As our patient acces rep took the call today, I called to get clarification, as well as to give information on how to reschedule, and left a message requesting a call back.     RIC Blood, OTR/L, CEAS-I  [phone]  3/13/2025     To schedule the driving evaluation, please call: [phone]

## 2025-04-29 ENCOUNTER — OFFICE VISIT (OUTPATIENT)
Dept: DERMATOLOGY | Facility: CLINIC | Age: 65
End: 2025-04-29
Payer: MEDICARE

## 2025-04-29 DIAGNOSIS — L98.9 DISEASE OF SKIN AND SUBCUTANEOUS TISSUE: Primary | ICD-10-CM

## 2025-04-29 DIAGNOSIS — L29.9 PRURITUS: ICD-10-CM

## 2025-04-29 PROCEDURE — 11104 PUNCH BX SKIN SINGLE LESION: CPT | Mod: S$GLB,,, | Performed by: DERMATOLOGY

## 2025-04-29 PROCEDURE — 1101F PT FALLS ASSESS-DOCD LE1/YR: CPT | Mod: CPTII,S$GLB,, | Performed by: DERMATOLOGY

## 2025-04-29 PROCEDURE — 3288F FALL RISK ASSESSMENT DOCD: CPT | Mod: CPTII,S$GLB,, | Performed by: DERMATOLOGY

## 2025-04-29 PROCEDURE — 99203 OFFICE O/P NEW LOW 30 MIN: CPT | Mod: 25,S$GLB,, | Performed by: DERMATOLOGY

## 2025-04-29 PROCEDURE — 88305 TISSUE EXAM BY PATHOLOGIST: CPT | Mod: TC | Performed by: DERMATOLOGY

## 2025-04-29 PROCEDURE — 1159F MED LIST DOCD IN RCRD: CPT | Mod: CPTII,S$GLB,, | Performed by: DERMATOLOGY

## 2025-04-29 PROCEDURE — 1160F RVW MEDS BY RX/DR IN RCRD: CPT | Mod: CPTII,S$GLB,, | Performed by: DERMATOLOGY

## 2025-04-29 PROCEDURE — 99999 PR PBB SHADOW E&M-EST. PATIENT-LVL III: CPT | Mod: PBBFAC,,, | Performed by: DERMATOLOGY

## 2025-04-29 RX ORDER — TRIAMCINOLONE ACETONIDE 1 MG/G
CREAM TOPICAL 2 TIMES DAILY
Qty: 454 G | Refills: 1 | Status: SHIPPED | OUTPATIENT
Start: 2025-04-29

## 2025-04-29 NOTE — PATIENT INSTRUCTIONS
You have been prescribed a topical steroid. There are possible side effects from overuse of topical steroids, including thinning of skin, lightening of skin, easy tearing/bruising of skin, stretch marks, etc. It is best to only use it when it's needed, preferably for no more than 2 weeks at a time to the same area, and to take breaks from the topical steroid, especially if any of the above side effects are noticed.    Punch Biopsy Wound Care    Your doctor has performed a punch biopsy today.  A band aid and antibiotic ointment has been placed over the site.  This should remain in place for 24 hours.  It is recommended that you keep the area dry for the first 24 hours.  After 24 hours, you may remove the band aid and wash the area with warm soap and water and apply Vaseline jelly.  Many patients prefer to use Neosporin or Bacitracin ointment.  This is acceptable; however know that you can develop an allergy to this medication even if you have used it safely for years.  It is important to keep the area moist.  Letting it dry out and get air slows healing time, will worsen the scar, and make it more difficult to remove the stitches if they were placed.  Band aid is optional after first 24 hours.      If you notice increasing redness, tenderness, pain, or yellow drainage at the biopsy or surgical site, please notify your doctor.  These are signs of an infection.    If your biopsy/surgical site is bleeding, apply firm pressure for 15 minutes straight.  Repeat for another 15 minutes, if it is still bleeding.   If the surgical site continues to bleed, then please contact your doctor.      For [employer] users:   You will receive your biopsy results in [facility] as soon as they are available. Please be assured that your physician/provider will review your results and will then determine what further treatment, evaluation, or planning is required. You should be contacted by your physician's/provider's office within 5  business days of receiving your results; If not, please reach out to directly. This is one more way [facility] is putting you first.       [address] [place] [address]/ [phone] FAX/ [email or web address]      XEROSIS (DRY SKIN)        Definition    Xerosis is the term for dry skin.  We all have a natural oil coating over our skin produced by the skin oil glands.  If this oil is removed, the skin becomes dry which can lead to cracking, which can lead to inflammation.  Xerosis is usually a long-term problem that recurs often, especially in the winter.    Cause    Long hot baths or showers can remove our natural oil and lead to xerosis.  One should never take more than one bath or shower a day and for no longer than ten minutes.  Use of harsh soaps such as Zest, Dial, and Ivory can worsen and cause xerosis.  Cold winter weather worsens xerosis because the amount of moisture contained in cold air is much less than the amount of moisture in warm air.    Treatment    Treatment is intended to restore the natural oil to your skin.  Keep the skin lubricated.    Do not take more than one bath or shower a day.  Use lukewarm water, not hot.  Hot water dries out the skin.    Use a gentle moisturizing soap such as Cetaphil soap, Oil of Olay, Dove, Basis, Ivory moisture care, Restoraderm cleanser.    When toweling dry, dont rub.  Blot the skin so there is still some water left on the skin.  You should apply a moisturizing cream to all of the skin such as Cerave cream, Cetaphil cream, Lipikar Pequea AP+ Intense Repair Moisturizing Cream or Restoraderm or Eucerin Original Formula cream.   Alpha hydroxyacid lotions, i.e., AmLactin, also work very well for preventing dry skin, but may burn when used on inflamed or reddened skin.    If you like to swim during the winter months, you should not use soap when getting out of the pool.  When you have finished swimming, rinse off the chlorine with cool to warm  water.  If this will be the only shower of the day, then you may use Cetaphil or another mild soap to cleanse your skin.  After the shower, apply a moisturizing cream to all of the skin as above.        [address] [place] [address]/ [phone] FAX/ [email or web address] Sun Protection      The [facility] would like to remind you of the importance of sun protection all year round and particularly during the summer when the suns rays are the strongest. It has been proven that both acute and chronic sun exposure damages our cells and leads to skin cancer. Beyond skin cancer, the sun causes 90% of the symptoms of premature skin aging, including wrinkles, lentigines (brown spots), and thin, easily bruised skin. Proper sun protection can help prevent these unwanted conditions.    Many patients report that they dont go in the sun. It has been shown that the average person receives 18 hours of incidental sun exposure per week during activities such as walking through parking lots, driving, or sitting next to windows. This accumulates to several bad sunburns per year!    In choosing sunscreen, you want one that protects against both UVA and UVB rays (broad spectrum). It is recommended that you use one of SPF 30 or higher. It is important to apply the sunscreen about 20 minutes prior to sun exposure. Most sunscreens are chemical sunscreens and a reaction must take place in the skin so that they are effective. If they are applied and then you are immediately exposed to the sun or start sweating, this reaction has not had time to take place and you are therefore unprotected. Sunscreen needs to be reapplied every 2 hours if you are participating in water sports or sweating. We recommend Elta MD or CeraVe sunscreens for daily use; however there are many options and it is most important for you to find one that you will use on a consistent basis.    If you have sensitive skin,  you may do best with a sunscreen that contains only physical blockers in the active ingredient section. The only physical blockers available in the [place] currently are titanium dioxide or zinc oxide. These are typically thicker and harder to apply, however they afford very good protection. Neutrogena Sensitive Skin, Blue Lizard Sensitive Skin (pink top) or Neutrogena Pure and Free are popular ones.     Aside from sunscreen, clothes with UV protection (UPF), wide brimmed hats, and sunglasses are other means of sun protection that we recommend.      Based on a recent study (6/2021) and out of an abundance of caution, we are recommending that you AVOID the following sunscreens as they may contain the carcinogen, benzene:    Spray and gel sunscreens  Any [facility] or [facility] brands as well as Max Block and TopCare brands   Neutrogena Ultra Sheer Dry-touch Water Resistant Sunscreen LOTION SPF 70   Neutrogena Sheer Zinc Dry-touch Face Sunscreen LOTION SPF 50   5.   Aveeno Baby Continuous Protection Sensitive Skin Sunscreen LOTION - Broad Spectrum SPF 50    Please note that Benzene is not an ingredient or the degradation product of any ingredient in any sunscreen. This study suggested that the findings are a result of contamination in the manufacturing process. At this point, we don't know how effectively Benzene gets through the skin, if it gets absorbed systemically, and what effects it may have.     We do know that ultraviolet radiation is a well-established carcinogen. Please use daily sun protection/avoidance and use of at least SPF 30, broad-spectrum sunscreen not listed above.                       [facility]  [address] [facility]  [facility] [address]  Dept: [phone]  Dept Fax: [phone]

## 2025-04-29 NOTE — PROGRESS NOTES
Subjective:      Patient ID:  Michelle Hankins is a 65 y.o. male who presents for   Chief Complaint   Patient presents with    Rash     Rash - Initial  Affected locations: torso, right buttock, left buttock, right lower leg, left lower leg, right ankle and left ankle  Duration: 1 year  Signs / symptoms: itching  Timing: intermittent  Relieving factors/Treatments tried: Rx topical steroids    States betamethasone valerate did not help.  Rash is scaly, occ itchy, and comes and goes.    Review of Systems   Constitutional: Negative.    HENT: Negative.     Respiratory: Negative.     Musculoskeletal: Negative.    Skin:  Positive for rash.       Objective:   Physical Exam   Constitutional: He appears well-developed and well-nourished. No distress.   Neurological: He is alert and oriented to person, place, and time. He is not disoriented.   Psychiatric: He has a normal mood and affect.   Skin:   Areas Examined (abnormalities noted in diagram):   Scalp / Hair Palpated and Inspected  Head / Face Inspection Performed  Neck Inspection Performed  Chest / Axilla Inspection Performed  Abdomen Inspection Performed  Genitals / Buttocks / Groin Inspection Performed  Back Inspection Performed  RUE Inspected  LUE Inspection Performed  RLE Inspected  LLE Inspection Performed  Nails and Digits Inspection Performed                  Diagram Legend     Erythematous scaling macule/papule c/w actinic keratosis       Vascular papule c/w angioma      Pigmented verrucoid papule/plaque c/w seborrheic keratosis      Yellow umbilicated papule c/w sebaceous hyperplasia      Irregularly shaped tan macule c/w lentigo     1-2 mm smooth white papules consistent with Milia      Movable subcutaneous cyst with punctum c/w epidermal inclusion cyst      Subcutaneous movable cyst c/w pilar cyst      Firm pink to brown papule c/w dermatofibroma      Pedunculated fleshy papule(s) c/w skin tag(s)      Evenly pigmented macule c/w junctional nevus     Mildly  variegated pigmented, slightly irregular-bordered macule c/w mildly atypical nevus      Flesh colored to evenly pigmented papule c/w intradermal nevus       Pink pearly papule/plaque c/w basal cell carcinoma      Erythematous hyperkeratotic cursted plaque c/w SCC      Surgical scar with no sign of skin cancer recurrence      Open and closed comedones      Inflammatory papules and pustules      Verrucoid papule consistent consistent with wart     Erythematous eczematous patches and plaques     Dystrophic onycholytic nail with subungual debris c/w onychomycosis     Umbilicated papule    Erythematous-base heme-crusted tan verrucoid plaque consistent with inflamed seborrheic keratosis     Erythematous Silvery Scaling Plaque c/w Psoriasis     See annotation      Assessment / Plan:    Disease of skin and subcutaneous tissue  Punch biopsy procedure note:  Punch biopsy performed after verbal consent obtained. Area marked and prepped with alcohol. Approximately 1cc of 1% lidocaine with epinephrine injected. 4 mm disposable punch used to remove lesion. Hemostasis obtained and biopsy site closed with 1 - 2 Prolene sutures. Wound care instructions reviewed with patient and handout given.    -     Specimen to Pathology, Dermatology  Pathology Orders:       Normal Orders This Visit    Specimen to Pathology, Dermatology     Questions:    Procedure Type: Dermatology and skin neoplasms    Number of Specimens: 1    ------------------------: -------------------------    Spec 1 Procedure: Punch Biopsy    Spec 1 Clinical Impression: r/o psoriasis vs other    Spec 1 Source: L back    Clinical Information: see EPIC    Clinical History: see EPIC    Specimen Source: Skin    Release to patient: Immediate    Send normal result to authorizing provider's In Basket if patient is active on MyChart: Yes            Pruritus  -     triamcinolone acetonide 0.1% (KENALOG) 0.1 % cream; Apply topically 2 (two) times daily. x 1-2 wks then prn flares only   Dispense: 454 g; Refill: 1  You have been prescribed a topical steroid. There are possible side effects from overuse of topical steroids, including thinning of skin, lightening of skin, easy tearing/bruising of skin, stretch marks, etc. It is best to only use it when it's needed, preferably for no more than 2 weeks at a time to the same area, and to take breaks from the topical steroid, especially if any of the above side effects are noticed.    Follow up in about 2 weeks (around 5/13/2025) for suture removal and biopsy results.

## 2025-05-02 ENCOUNTER — RESULTS FOLLOW-UP (OUTPATIENT)
Dept: DERMATOLOGY | Facility: CLINIC | Age: 65
End: 2025-05-02

## 2025-05-02 LAB
ESTROGEN SERPL-MCNC: NORMAL PG/ML
INSULIN SERPL-ACNC: NORMAL U[IU]/ML
LAB AP CLINICAL INFORMATION: NORMAL
LAB AP GROSS DESCRIPTION: NORMAL
LAB AP REPORT FOOTNOTES: NORMAL
T3RU NFR SERPL: NORMAL %

## 2025-05-03 RX ORDER — BETAMETHASONE VALERATE 1 MG/G
CREAM TOPICAL 2 TIMES DAILY
Qty: 60 G | Refills: 0 | Status: SHIPPED | OUTPATIENT
Start: 2025-05-03

## 2025-05-03 NOTE — TELEPHONE ENCOUNTER
Refill Decision Note   Michelle Hankins  is requesting a refill authorization.  Brief Assessment and Rationale for Refill:  Approve     Medication Therapy Plan:       Medication Reconciliation Completed: No   Comments:     No Care Gaps recommended.     Note composed:1:46 PM 05/03/2025

## 2025-05-03 NOTE — TELEPHONE ENCOUNTER
No care due was identified.  [facility] Embedded Care Due Messages. Reference number: [id number].   5/03/2025 6:56:53 AM CDT

## 2025-05-12 ENCOUNTER — CLINICAL SUPPORT (OUTPATIENT)
Dept: DERMATOLOGY | Facility: CLINIC | Age: 65
End: 2025-05-12
Payer: MEDICARE

## 2025-05-12 DIAGNOSIS — Z48.02 VISIT FOR SUTURE REMOVAL: Primary | ICD-10-CM

## 2025-05-12 PROCEDURE — 99024 POSTOP FOLLOW-UP VISIT: CPT | Mod: S$GLB,,, | Performed by: DERMATOLOGY

## 2025-05-12 PROCEDURE — 99999 PR PBB SHADOW E&M-EST. PATIENT-LVL III: CPT | Mod: PBBFAC,,,

## 2025-05-12 NOTE — PROGRESS NOTES
Suture Removal note:  CC: 65 y.o. male patient is here for suture removal.         HPI: Patient is s/p excision of r/o psoriasis vs other from the L back on 04/29/2025 12:17 PM.  Patient reports no problems.    WOUND PE:  Sutures intact.  Wound healing well.  Good approximation of skin edges.  No signs or symptoms of infection.    IMPRESSION:  Skin, left back, punch biopsy:  - SPONGIOTIC DERMATITIS (see comment)  Comment: The histologic findings are most consistent with a subacute spongiotic  dermatitis such as contact, atopic, or nummular dermatitis. Clinical correlation is  recommended.  No cancer cells noted on histologic examination. Your provider will correlate this  pathology report with your clinical findings - margins clear.    PLAN:  Sutures removed today.  Continue wound care.    RTC: PRN

## 2025-05-30 RX ORDER — BETAMETHASONE VALERATE 1 MG/G
CREAM TOPICAL 2 TIMES DAILY
Qty: 60 G | Refills: 0 | Status: SHIPPED | OUTPATIENT
Start: 2025-05-30

## 2025-05-30 NOTE — TELEPHONE ENCOUNTER
No care due was identified.  [facility] Embedded Care Due Messages. Reference number: [id number].   5/30/2025 9:05:58 AM CDT

## 2025-05-30 NOTE — TELEPHONE ENCOUNTER
Refill Decision Note   Michelle Hankins  is requesting a refill authorization.  Brief Assessment and Rationale for Refill:        Medication Therapy Plan:            Comments:     Note composed:1:45 PM 05/30/2025

## 2025-06-27 RX ORDER — BETAMETHASONE VALERATE 1 MG/G
CREAM TOPICAL 2 TIMES DAILY
Qty: 60 G | Refills: 0 | Status: SHIPPED | OUTPATIENT
Start: 2025-06-27

## 2025-06-27 NOTE — TELEPHONE ENCOUNTER
Refill Decision Note   Michelle Hankins  is requesting a refill authorization.  Brief Assessment and Rationale for Refill:  Approve     Medication Therapy Plan:         Comments:     Note composed:1:45 PM 06/27/2025             Appointments     Last Visit   2/10/2025 Cedric Garcia MD   Next Visit   Visit date not found Cedric Garcia MD

## 2025-06-27 NOTE — TELEPHONE ENCOUNTER
No care due was identified.  [facility] Embedded Care Due Messages. Reference number: [id number].   6/27/2025 6:54:05 AM CDT

## 2025-07-06 NOTE — TELEPHONE ENCOUNTER
No care due was identified.  [facility] Embedded Care Due Messages. Reference number: [id number].   7/06/2025 7:55:54 AM CDT

## 2025-07-07 NOTE — TELEPHONE ENCOUNTER
Deferring to PCP to review this drug in lieu of progress note found under DERMATOLOGY in which effectiveness questioned and placed on TRIAMCINOLONE at that time.

## 2025-07-07 NOTE — TELEPHONE ENCOUNTER
Refill Routing Note   Medication(s) are not appropriate for processing by [facility] for the following reason(s):        Clarification of medication (Rx) details-see notes. Unclear if this med is still appropriate    [facility] action(s):  Defer        Medication Therapy Plan: Pt has been using but notes found from 4/29/25 from Dermatology stated BETAmethasone not working. Was placed on triamcinolone      Appointments  past 12m or future 3m with PCP    Date Provider   Last Visit   2/10/2025 Cedric Garcia MD   Next Visit   Visit date not found Cedric Garcai MD   ED visits in past 90 days: 0        Note composed:2:40 PM 07/07/2025

## 2025-07-08 RX ORDER — BETAMETHASONE VALERATE 1 MG/G
CREAM TOPICAL 2 TIMES DAILY
Qty: 60 G | Refills: 0 | Status: SHIPPED | OUTPATIENT
Start: 2025-07-08

## 2025-07-21 DIAGNOSIS — L29.9 PRURITUS: ICD-10-CM

## 2025-07-21 RX ORDER — TRIAMCINOLONE ACETONIDE 1 MG/G
CREAM TOPICAL
Qty: 454 G | Refills: 0 | Status: SHIPPED | OUTPATIENT
Start: 2025-07-21

## 2025-07-21 NOTE — TELEPHONE ENCOUNTER
04/29/25       Pruritus  -     triamcinolone acetonide 0.1% (KENALOG) 0.1 % cream; Apply topically 2 (two) times daily. x 1-2 wks then prn flares only  Dispense: 454 g; Refill: 1  You have been prescribed a topical steroid. There are possible side effects from overuse of topical steroids, including thinning of skin, lightening of skin, easy tearing/bruising of skin, stretch marks, etc. It is best to only use it when it's needed, preferably for no more than 2 weeks at a time to the same area, and to take breaks from the topical steroid, especially if any of the above side effects are noticed.     Follow up in about 2 weeks (around 5/13/2025) for suture removal and biopsy results.

## 2025-08-03 NOTE — TELEPHONE ENCOUNTER
No care due was identified.  [facility] Embedded Care Due Messages. Reference number: [id number].   8/03/2025 7:55:40 AM CDT

## 2025-08-04 RX ORDER — BETAMETHASONE VALERATE 1 MG/G
CREAM TOPICAL 2 TIMES DAILY
Qty: 60 G | Refills: 0 | Status: SHIPPED | OUTPATIENT
Start: 2025-08-04

## 2025-08-04 NOTE — TELEPHONE ENCOUNTER
Refill Routing Note   Medication(s) are not appropriate for processing by [facility] for the following reason(s):        New or recently adjusted medication    [facility] action(s):  Defer        Medication Therapy Plan: has not been 90-days since this med began      Appointments  past 12m or future 3m with PCP    Date Provider   Last Visit   2/10/2025 Cedric Garcia MD   Next Visit   Visit date not found Cedric Garcia MD   ED visits in past 90 days: 0        Note composed:1:21 PM 08/04/2025